# Patient Record
Sex: FEMALE | Race: WHITE
[De-identification: names, ages, dates, MRNs, and addresses within clinical notes are randomized per-mention and may not be internally consistent; named-entity substitution may affect disease eponyms.]

---

## 2017-06-05 ENCOUNTER — HOSPITAL ENCOUNTER (OUTPATIENT)
Dept: HOSPITAL 8 - CFH | Age: 57
Discharge: HOME | End: 2017-06-05
Attending: OBSTETRICS & GYNECOLOGY
Payer: COMMERCIAL

## 2017-06-05 DIAGNOSIS — Z80.3: ICD-10-CM

## 2017-06-05 DIAGNOSIS — Z12.31: Primary | ICD-10-CM

## 2017-06-05 PROCEDURE — 77063 BREAST TOMOSYNTHESIS BI: CPT

## 2017-06-05 PROCEDURE — G0202 SCR MAMMO BI INCL CAD: HCPCS

## 2017-10-12 ENCOUNTER — OFFICE VISIT (OUTPATIENT)
Dept: MEDICAL GROUP | Facility: MEDICAL CENTER | Age: 57
End: 2017-10-12
Payer: COMMERCIAL

## 2017-10-12 VITALS
HEIGHT: 66 IN | OXYGEN SATURATION: 96 % | DIASTOLIC BLOOD PRESSURE: 70 MMHG | TEMPERATURE: 98.4 F | HEART RATE: 67 BPM | BODY MASS INDEX: 23.01 KG/M2 | WEIGHT: 143.2 LBS | RESPIRATION RATE: 16 BRPM | SYSTOLIC BLOOD PRESSURE: 122 MMHG

## 2017-10-12 DIAGNOSIS — G44.81 HYPNIC HEADACHE: ICD-10-CM

## 2017-10-12 DIAGNOSIS — Z76.89 ENCOUNTER TO ESTABLISH CARE: ICD-10-CM

## 2017-10-12 DIAGNOSIS — G43.001 MIGRAINE WITHOUT AURA AND WITH STATUS MIGRAINOSUS, NOT INTRACTABLE: ICD-10-CM

## 2017-10-12 DIAGNOSIS — I10 ESSENTIAL HYPERTENSION: ICD-10-CM

## 2017-10-12 DIAGNOSIS — Z12.11 SCREENING FOR MALIGNANT NEOPLASM OF COLON: ICD-10-CM

## 2017-10-12 DIAGNOSIS — E04.1 THYROID NODULE: ICD-10-CM

## 2017-10-12 DIAGNOSIS — Z23 NEED FOR INFLUENZA VACCINATION: ICD-10-CM

## 2017-10-12 DIAGNOSIS — E78.5 DYSLIPIDEMIA: ICD-10-CM

## 2017-10-12 DIAGNOSIS — Z00.00 HEALTH CARE MAINTENANCE: ICD-10-CM

## 2017-10-12 PROCEDURE — 90471 IMMUNIZATION ADMIN: CPT | Performed by: INTERNAL MEDICINE

## 2017-10-12 PROCEDURE — 90686 IIV4 VACC NO PRSV 0.5 ML IM: CPT | Performed by: INTERNAL MEDICINE

## 2017-10-12 PROCEDURE — 99204 OFFICE O/P NEW MOD 45 MIN: CPT | Mod: 25 | Performed by: INTERNAL MEDICINE

## 2017-10-12 RX ORDER — INDOMETHACIN 75 MG/1
75 CAPSULE, EXTENDED RELEASE ORAL DAILY
COMMUNITY
End: 2017-12-06

## 2017-10-12 RX ORDER — SUMATRIPTAN 100 MG/1
100 TABLET, FILM COATED ORAL
COMMUNITY
End: 2018-07-23 | Stop reason: SDUPTHER

## 2017-10-12 RX ORDER — ATORVASTATIN CALCIUM 10 MG/1
10 TABLET, FILM COATED ORAL NIGHTLY
COMMUNITY
End: 2018-02-13 | Stop reason: SDUPTHER

## 2017-10-12 RX ORDER — PROPRANOLOL HYDROCHLORIDE 20 MG/1
20 TABLET ORAL 2 TIMES DAILY
COMMUNITY
End: 2017-12-06 | Stop reason: SDUPTHER

## 2017-10-12 ASSESSMENT — PATIENT HEALTH QUESTIONNAIRE - PHQ9: CLINICAL INTERPRETATION OF PHQ2 SCORE: 0

## 2017-10-12 NOTE — LETTER
[facility]  Sera Grey M.D.  [address] [place] [address]  Fax: [phone]   Authorization for Release/Disclosure of   Protected Health Information   Name: MARYANN SOUTH : 1960 SSN: [id number]   Address: [address] [place] [address] [place] [address] Phone:    [phone] (home)    I authorize the entity listed below to release/disclose the PHI below to:   [facility]/Sera Grey M.D. and Alissa Garcia M.D.   Provider or Entity Name:     Address   City, State, Zip   Phone:      Fax:     Reason for request: continuity of care   Information to be released:    [  ] LAST COLONOSCOPY,  including any PATH REPORT and follow-up  [  ] LAST FIT/COLOGUARD RESULT [  ] LAST DEXA  [  ] LAST MAMMOGRAM  [  ] LAST PAP  [  ] LAST LABS [  ] RETINA EXAM REPORT  [  ] IMMUNIZATION RECORDS  [  ] Release all info      [  ] Check here and initial the line next to each item to release ALL health information INCLUDING  _____ Care and treatment for drug and / or alcohol abuse  _____ HIV testing, infection status, or AIDS  _____ Genetic Testing    DATES OF SERVICE OR TIME PERIOD TO BE DISCLOSED: _____________  I understand and acknowledge that:  * This Authorization may be revoked at any time by you in writing, except if your health information has already been used or disclosed.  * Your health information that will be used or disclosed as a result of you signing this authorization could be re-disclosed by the recipient. If this occurs, your re-disclosed health information may no longer be protected by State or Federal laws.  * You may refuse to sign this Authorization. Your refusal will not affect your ability to obtain treatment.  * This Authorization becomes effective upon signing and will  on (date) __________.      If no date is indicated, this Authorization will  one (1) year from the signature date.    Name: Maryann South    Signature:   Date:     10/12/2017       PLEASE FAX REQUESTED  RECORDS BACK TO: [phone]

## 2017-10-12 NOTE — PROGRESS NOTES
CHIEF COMPLAINT  Chief Complaint   Patient presents with   • Establish Care   Thyroid nodule    HPI  Patient is a 56 y.o. female patient who presents today for the following     Thyroid nodules, multiple  New Dg: US on 8/25/17:  - posterior mid gland; size, 0.9-1cm, with unclear margins, suspicious.   Per patient, TSH was normal.   ENT OV: 10/16/17, in 4 days.      HYPERTENSION  Meds: propranolol, 20 mg TID, taking as prescribed.   Measuring BP at home: yes,, has jacques < 140/90.  Denies:  -  headaches, vision problems, tinnitus.                 -  chest pain/pressure, palpitations, irregular heart beats, exertional, dyspnea, peripheral edema.      - medication side effect: unusual fatigue, slow heartbeat, foot/leg swelling, cough.  Low salt diet: no  Diet: regular  Exercise: > 4 days a week  BMI: Body mass index is 23.11 kg/m².  FH of HTN: father    DYSLIPIDEMIA  The patient is on lipitor 10 mh, taking daily, as prescribed. No myalgias, muscle cramps or pain.   Diet /Exercise:  As above  BMI: There is no weight on file to calculate BMI.  FH: father    Hypnic HA  Sx: at the age of 48  Dg: ~ 5 yrs  Meds: on indomethacin 75 mg QD.    MIGRAINE  Onset: after he age of 49 y/o  The patient reports  left temporal headaches, described as: moderate in severity , dull with no nausea, emesis, photophobia or aura, without radiation.  Usual frequency is once a month.  Aura is not present.  Headaches are relieved by sumatriptan, propranolol as prophylaxis  Known triggers include: none.   Current stressors include: none  Previous treatment and prevention include: none    Pertinent negatives:  Denies difficulty with speech or swallowing, facial numbness or tingling, focal weakness. Denies sore throat or cough, fever, sinus congestion, ear pain, tooth clenching or grinding.    Family Hx: no known family members with significant headaches  Prior imaging: yes    Reviewed PMH, PSH, FH, SH, ALL, HCM/IMM  Reviewed MEDS    Patient Active  Problem List    Diagnosis Date Noted   • Health care maintenance 10/12/2017   • Essential hypertension 10/12/2017   • Dyslipidemia 10/12/2017   • Hypnic headache 10/12/2017   • Thyroid nodule 10/12/2017   • Giant cell tumor of bone      CURRENT MEDICATIONS  Current Outpatient Prescriptions   Medication Sig Dispense Refill   • propranolol (INDERAL) 20 MG Tab Take 20 mg by mouth 3 times a day.     • atorvastatin (LIPITOR) 10 MG Tab Take 10 mg by mouth every evening.     • indomethacin SR (INDOCIN SR) 75 MG Cap CR Take 75 mg by mouth every day.     • sumatriptan (IMITREX) 100 MG tablet Take 100 mg by mouth Once PRN for Migraine.       No current facility-administered medications for this visit.      ALLERGIES  Allergies: Review of patient's allergies indicates no known allergies.    PAST MEDICAL HISTORY  Past Medical History:   Diagnosis Date   • Giant cell tumor of bone 2006    removal from L distal femur   • Hypertension    • Hypnic headache    • Migraine    • Thyroid nodule      SURGICAL HISTORY  She  has a past surgical history that includes fusion (2004) and abdominal hysterectomy total (1985).    SOCIAL HISTORY  Social History   Substance Use Topics   • Smoking status: Never Smoker   • Smokeless tobacco: Never Used   • Alcohol use Yes      Comment: occ     Social History     Social History Narrative   • No narrative on file     FAMILY HISTORY  Family History   Problem Relation Age of Onset   • Cancer Mother      breast   • Diabetes Mother    • Cancer Father      metastatic   • Heart Disease Father    • Hypertension Father    • Cancer Sister      breast   • Diabetes Sister    • Diabetes Maternal Grandmother      Family Status   Relation Status   • Mother    • Father    • Sister    • Maternal Grandmother        ROS   Constitutional: Negative for fever, chills.  HENT: Negative for congestion, sore throat.  Eyes: Negative for blurred vision.   Respiratory: Negative for cough, shortness of breath.  Cardiovascular:  "Negative for chest pain, palpitations. And per HPI.  Gastrointestinal: Negative for heartburn, nausea, abdominal pain.   Genitourinary: Negative for dysuria.  Musculoskeletal: Negative for significant myalgias, back pain and joint pain.   Skin: Negative for rash and itching.   Neuro: Negative for dizziness, weakness and as above.  Endo/Heme/Allergies: Does not bruise/bleed easily. And per HPI.  Psychiatric/Behavioral: Negative for depression, anxiety    PHYSICAL EXAM   Blood pressure 122/70, pulse 67, temperature 36.9 °C (98.4 °F), resp. rate 16, height 1.676 m (5' 6\"), weight 65 kg (143 lb 3.2 oz), SpO2 96 %. Body mass index is 23.11 kg/m².  General:  NAD, well appearing  HEENT:   NC/AT, PERRLA, EOMI, TMs are clear. Oropharyngeal mucosa is pink,  without lesions;  no cervical / supraclavicular  lymphadenopathy, no thyromegaly.    Cardiovascular: RRR.   No m/r/g. No carotid bruits .      Lungs:   CTAB, no w/r/r, no respiratory distress.  Abdomen: Soft, NT/ND + BS; no suprapubic tenderness; no masses or hepatosplenomegaly.  Extremities:  2+ DP and radial pulses bilaterally.  No c/c/e.   Skin:  Warm, dry.  No erythema. No rash.   Neurologic: Alert & oriented x 3. CN II-XII grossly intact. Brachioradialis / knee DTR are 2/4, symmetric. Strength and sensation grossly intact.  No focal deficits.  Psychiatric:  Affect normal, mood normal, judgment normal.    LABS     Pending old records    IMAGING     Reviewed thyroid ultrasound from 8/29/17 (scaned in media):  Bilateral thyroid nodules, suspicious thyroid nodule in posterior mid gland.    ASSESMENT AND PLAN        1. Thyroid nodule  The patient will be seen by ENT in 4 days    2. Essential hypertension  Controlled, continue current treatment    3. Dyslipidemia  Continue current treatment, pending old records    4. Hypnic headache  Controlled, continue current treatment    5. Migraine without aura and with status migrainosus, not intractable  Controlled, continue current " treatment    6. Encounter to establish care  Reviewed PMH, PSH, FH, SH, ALL, MEDS, HCM/IMM.   Advised healthy habits, diet, exercise.  Release form for old records: signed    7. Need for influenza vaccination  - Flu Quad Inj >3 Year Pre-Filled (Preservative Free)  Information was provided to the patient regarding the vaccine, including side effects. Vaccine was given by my medical assistant under my supervision.    8. Health care maintenance  Pending old records    9. Screening for malignant neoplasm of colon  - OCCULT BLOOD FECES IMMUNOASSAY; Future    Counseling:   - Smoking:  Nonsmoker    Followup: Return in about 3 months (around 1/12/2018) for Short.    All questions are answered.    Please note that this dictation was created using voice recognition software, and that there might be errors of jenny and possibly content.

## 2017-10-12 NOTE — LETTER
[facility]  Sera Grey M.D.  [address] [place] [address]  Fax: [phone]   Authorization for Release/Disclosure of   Protected Health Information   Name: MARYANN SOUTH : 1960 SSN: [id number]   Address: [address] [place] [address] [place] [address] Phone:    [phone] (home)    I authorize the entity listed below to release/disclose the PHI below to:   [facility]/Sera Grey M.D. and Alissa Garcia M.D.   Provider or Entity Name:     Address   City, State, Zip   Phone:      Fax:     Reason for request: continuity of care   Information to be released:    [  ] LAST COLONOSCOPY,  including any PATH REPORT and follow-up  [  ] LAST FIT/COLOGUARD RESULT [  ] LAST DEXA  [  ] LAST MAMMOGRAM  [  ] LAST PAP  [  ] LAST LABS [  ] RETINA EXAM REPORT  [  ] IMMUNIZATION RECORDS  [  ] Release all info      [  ] Check here and initial the line next to each item to release ALL health information INCLUDING  _____ Care and treatment for drug and / or alcohol abuse  _____ HIV testing, infection status, or AIDS  _____ Genetic Testing    DATES OF SERVICE OR TIME PERIOD TO BE DISCLOSED: _____________  I understand and acknowledge that:  * This Authorization may be revoked at any time by you in writing, except if your health information has already been used or disclosed.  * Your health information that will be used or disclosed as a result of you signing this authorization could be re-disclosed by the recipient. If this occurs, your re-disclosed health information may no longer be protected by State or Federal laws.  * You may refuse to sign this Authorization. Your refusal will not affect your ability to obtain treatment.  * This Authorization becomes effective upon signing and will  on (date) __________.      If no date is indicated, this Authorization will  one (1) year from the signature date.    Name: Maryann South    Signature:   Date:     10/12/2017       PLEASE FAX REQUESTED  RECORDS BACK TO: [phone]

## 2017-11-30 ENCOUNTER — OFFICE VISIT (OUTPATIENT)
Dept: MEDICAL GROUP | Facility: PHYSICIAN GROUP | Age: 57
End: 2017-11-30
Payer: COMMERCIAL

## 2017-11-30 VITALS
BODY MASS INDEX: 24.23 KG/M2 | TEMPERATURE: 98.9 F | OXYGEN SATURATION: 94 % | SYSTOLIC BLOOD PRESSURE: 144 MMHG | HEART RATE: 92 BPM | DIASTOLIC BLOOD PRESSURE: 80 MMHG | WEIGHT: 150.79 LBS | HEIGHT: 66 IN

## 2017-11-30 DIAGNOSIS — N30.01 ACUTE CYSTITIS WITH HEMATURIA: ICD-10-CM

## 2017-11-30 PROCEDURE — 99000 SPECIMEN HANDLING OFFICE-LAB: CPT | Performed by: FAMILY MEDICINE

## 2017-11-30 PROCEDURE — 99213 OFFICE O/P EST LOW 20 MIN: CPT | Mod: 25 | Performed by: FAMILY MEDICINE

## 2017-11-30 RX ORDER — NITROFURANTOIN 25; 75 MG/1; MG/1
100 CAPSULE ORAL 2 TIMES DAILY
Qty: 10 CAP | Refills: 0 | Status: SHIPPED | OUTPATIENT
Start: 2017-11-30 | End: 2018-03-26

## 2017-12-01 NOTE — PROGRESS NOTES
"Subjective:      Maryann Ashley is a 56 y.o. female who presents with UTI            HPI     This is a 56-year-old white female patient of Dr. Bryant is here because of 2 day duration of burning on urination. She said today she started having flank pain. She denies any abdominal pain, frequency, urgency, fever or chills. She said she had UTI in the past and the symptoms felt like these. She said she also has history of microscopic hematuria and she always has trace to small amount of blood in the urine. She said she has been worked up for the hematuria without any cause found. She has not seen any gross blood recently.    She said she just recently got back from a vacation and she was not able to go to the bathroom right away when her bladder got while she was on the plane or in the airport.    She also has left arm pain that started when she was on vacation. This is however getting better. She already scheduled an appointment to see Dr. Bryant for this problem and her appointment is on 12/6/17.    Past medical history, past surgical history, family history reviewed-no changes    Social history reviewed-no changes    Allergies reviewed-no changes    Medications reviewed-no changes    ROS     Review of systems as per history of present illness, the rest are negative.       Objective:     /80   Pulse 92   Temp 37.2 °C (98.9 °F)   Ht 1.676 m (5' 6\")   Wt 68.4 kg (150 lb 12.7 oz)   SpO2 94%   BMI 24.34 kg/m²      Physical Exam     Examined alert, awake, oriented, not in distress    Neck-supple, no lymphadenopathy, no thyromegaly  Lungs-clear to auscultation, no rales, no wheezes  Heart-regular rate and rhythm, no murmur  Abdomen-good bowel sounds, soft, nontender, no hepatosplenomegaly, no masses, no CVA tenderness.  Extremities-no edema, clubbing, cyanosis    Urine dipstick    Blood large, leukocytes moderate  , the rest are negative          Assessment/Plan:     1. Acute cystitis with hematuria   Urine dipstick " consistent with UTI. I will treat with Macrodantin pending culture results. Advised increase by mouth fluids. I will communicate with patient results once available. Keep appointment with Dr. Bryant next week.  - POCT Urinalysis  - URINE CULTURE(NEW); Future  - nitrofurantoin monohydr macro (MACROBID) 100 MG Cap; Take 1 Cap by mouth 2 times a day.  Dispense: 10 Cap; Refill: 0      Please note that this dictation was created using voice recognition software. I have worked with consultants from the vendor as well as technical experts from [facility] to optimize the interface. I have made every reasonable attempt to correct obvious errors, but I expect that there are errors of grammar and possibly content I did not discover before finalizing the note.

## 2017-12-04 ENCOUNTER — APPOINTMENT (OUTPATIENT)
Dept: OTHER | Facility: IMAGING CENTER | Age: 57
End: 2017-12-04

## 2017-12-04 PROCEDURE — 97530 THERAPEUTIC ACTIVITIES: CPT | Performed by: FAMILY MEDICINE

## 2017-12-05 ENCOUNTER — TELEPHONE (OUTPATIENT)
Dept: MEDICAL GROUP | Facility: PHYSICIAN GROUP | Age: 57
End: 2017-12-05

## 2017-12-05 NOTE — TELEPHONE ENCOUNTER
----- Message from Meagan Rodriguez M.D. sent at 12/5/2017 10:41 AM PST -----  Her urine culture came back she definitely has urinary tract infection and antibiotics I gave her will work for the infection. I hope she is already feeling better.

## 2017-12-06 ENCOUNTER — OFFICE VISIT (OUTPATIENT)
Dept: MEDICAL GROUP | Facility: MEDICAL CENTER | Age: 57
End: 2017-12-06
Payer: COMMERCIAL

## 2017-12-06 VITALS
HEART RATE: 74 BPM | SYSTOLIC BLOOD PRESSURE: 112 MMHG | HEIGHT: 66 IN | OXYGEN SATURATION: 96 % | RESPIRATION RATE: 16 BRPM | TEMPERATURE: 97.7 F | WEIGHT: 146.83 LBS | BODY MASS INDEX: 23.6 KG/M2 | DIASTOLIC BLOOD PRESSURE: 70 MMHG

## 2017-12-06 DIAGNOSIS — Z12.39 SCREENING FOR MALIGNANT NEOPLASM OF BREAST: ICD-10-CM

## 2017-12-06 DIAGNOSIS — E04.1 THYROID NODULE: ICD-10-CM

## 2017-12-06 DIAGNOSIS — G44.81 HYPNIC HEADACHE: ICD-10-CM

## 2017-12-06 DIAGNOSIS — I10 ESSENTIAL HYPERTENSION: ICD-10-CM

## 2017-12-06 PROCEDURE — 99214 OFFICE O/P EST MOD 30 MIN: CPT | Performed by: INTERNAL MEDICINE

## 2017-12-06 RX ORDER — PROPRANOLOL HYDROCHLORIDE 20 MG/1
20 TABLET ORAL 2 TIMES DAILY
Qty: 60 TAB | Refills: 5 | Status: SHIPPED | OUTPATIENT
Start: 2017-12-06 | End: 2018-06-15 | Stop reason: SDUPTHER

## 2017-12-06 NOTE — PROGRESS NOTES
CHIEF COMPLAINT  Thyroid nodule    HPI  Patient is a 56 y.o. female patient who presents today for the following     Thyroid nodules, multiple  New Dg: US on 8/25/17:  - posterior mid gland; size, 0.9-1cm, with unclear margins, suspicious.   Per patient, TSH was normal.   ENT OV: 10/16/17,   - requested bx, that was unsuccessful due to the size.         HYPERTENSION  Meds: propranolol, 20 mg TID, taking as prescribed; NEEDS REFILL.   Measuring BP at home: yes,, has jacques < 140/90.  Denies:  -  headaches, vision problems, tinnitus.                 -  chest pain/pressure, palpitations, irregular heart beats, exertional, dyspnea, peripheral edema.                 - medication side effect: unusual fatigue, slow heartbeat, foot/leg swelling, cough.  Low salt diet: no  Diet: regular  Exercise: > 4 days a week  BMI: Body mass index is 23 kg/m².  FH of HTN: father     Hypnic HA  Sx: at the age of 48  Dg: ~ 5 yrs  Meds: stopped indomethacin.   - started melatonin 3 mg HS, that improved symptoms     Reviewed PMH, PSH, FH, SH, ALL, HCM/IMM, no changes  Reviewed MEDS, no changes    Patient Active Problem List    Diagnosis Date Noted   • Health care maintenance 10/12/2017   • Essential hypertension 10/12/2017   • Dyslipidemia 10/12/2017   • Hypnic headache 10/12/2017   • Thyroid nodule 10/12/2017   • Giant cell tumor of bone      CURRENT MEDICATIONS  Current Outpatient Prescriptions   Medication Sig Dispense Refill   • propranolol (INDERAL) 20 MG Tab Take 1 Tab by mouth 2 times a day. 60 Tab 5   • nitrofurantoin monohydr macro (MACROBID) 100 MG Cap Take 1 Cap by mouth 2 times a day. 10 Cap 0   • atorvastatin (LIPITOR) 10 MG Tab Take 10 mg by mouth every evening.     • sumatriptan (IMITREX) 100 MG tablet Take 100 mg by mouth Once PRN for Migraine.       No current facility-administered medications for this visit.      ALLERGIES  Allergies: Patient has no known allergies.  PAST MEDICAL HISTORY  Past Medical History:   Diagnosis Date  "  • Giant cell tumor of bone 2006    removal from L distal femur   • Hypertension    • Hypnic headache    • Migraine    • Thyroid nodule      SURGICAL HISTORY  She  has a past surgical history that includes fusion (2004) and abdominal hysterectomy total (1985).  SOCIAL HISTORY  Social History   Substance Use Topics   • Smoking status: Never Smoker   • Smokeless tobacco: Never Used   • Alcohol use Yes      Comment: occ     Social History     Social History Narrative   • No narrative on file     FAMILY HISTORY  Family History   Problem Relation Age of Onset   • Cancer Mother      breast   • Diabetes Mother    • Cancer Father      metastatic   • Heart Disease Father    • Hypertension Father    • Cancer Sister      breast   • Diabetes Sister    • Diabetes Maternal Grandmother      Family Status   Relation Status   • Mother    • Father    • Sister    • Maternal Grandmother      ROS   Constitutional: Negative for fever, chills.  HENT: Negative for congestion, sore throat.  Eyes: Negative for blurred vision.   Respiratory: Negative for cough, shortness of breath.  Cardiovascular: Negative for chest pain, palpitations. And per HPI.  Gastrointestinal: Negative for heartburn, nausea, abdominal pain.   Genitourinary: Negative for dysuria.  Musculoskeletal: Negative for significant myalgias, back pain and joint pain.   Skin: Negative for rash and itching.   Neuro: Negative for dizziness, weakness and as above.   Endo/Heme/Allergies: Does not bruise/bleed easily. And per HPI.  Psychiatric/Behavioral: Negative for depression, anxiety    PHYSICAL EXAM   Blood pressure 112/70, pulse 74, temperature 36.5 °C (97.7 °F), resp. rate 16, height 1.676 m (5' 6\"), weight 66.6 kg (146 lb 13.2 oz), SpO2 96 %. Body mass index is 23.7 kg/m².  General:  NAD, well appearing  HEENT:   NC/AT, PERRLA, EOMI, TMs are clear. Oropharyngeal mucosa is pink,  without lesions;  no cervical / supraclavicular  lymphadenopathy, no thyromegaly.  "   Cardiovascular: RRR.   No m/r/g. No carotid bruits .      Lungs:   CTAB, no w/r/r, no respiratory distress.  Abdomen: Soft, NT/ND + BS; no suprapubic tenderness; no masses or hepatosplenomegaly.  Extremities:  2+ DP and radial pulses bilaterally.  No c/c/e.   Skin:  Warm, dry.  No erythema. No rash.   Neurologic: Alert & oriented x 3.  No focal deficits.  Psychiatric:  Affect normal, mood normal, judgment normal.    LABS     Pending    IMAGING     None    ASSESMENT AND PLAN        1. Thyroid nodule  Follow-up thyroid ultrasound after 6 months  - US-SOFT TISSUES OF HEAD - NECK; Future  - TSH; Future  - FREE THYROXINE; Future    2. Essential hypertension  Controlled, continue current treatment, refill:  - propranolol (INDERAL) 20 MG Tab; Take 1 Tab by mouth 2 times a day.  Dispense: 60 Tab; Refill: 5    3. Hypnic headache  May increase melatonin to 6 and then maximum 9 mg per day    4. Screening for malignant neoplasm of breast  - MA-MAMMO SCREENING BILAT W/RIVERA W/CAD; Future    Counseling:   - Smoking:  Nonsmoker    Followup: Return in about 3 months (around 3/6/2018) for Short.    All questions are answered.    Please note that this dictation was created using voice recognition software, and that there might be errors of jenny and possibly content.

## 2018-01-23 ENCOUNTER — PATIENT MESSAGE (OUTPATIENT)
Dept: MEDICAL GROUP | Facility: MEDICAL CENTER | Age: 58
End: 2018-01-23

## 2018-01-23 DIAGNOSIS — H18.519 FUCHS' CORNEAL DYSTROPHY: ICD-10-CM

## 2018-01-25 ENCOUNTER — PATIENT MESSAGE (OUTPATIENT)
Dept: MEDICAL GROUP | Facility: MEDICAL CENTER | Age: 58
End: 2018-01-25

## 2018-01-25 DIAGNOSIS — I10 ESSENTIAL HYPERTENSION: ICD-10-CM

## 2018-01-25 RX ORDER — INDOMETHACIN 75 MG/1
CAPSULE, EXTENDED RELEASE ORAL
COMMUNITY
Start: 2017-11-28 | End: 2018-01-26 | Stop reason: SDUPTHER

## 2018-01-25 RX ORDER — INDOMETHACIN 75 MG/1
75 CAPSULE, EXTENDED RELEASE ORAL
Qty: 30 CAP | Refills: 0 | OUTPATIENT
Start: 2018-01-25

## 2018-01-25 NOTE — TELEPHONE ENCOUNTER
Was the patient seen in the last year in this department? Yes     Does patient have an active prescription for medications requested? No     Received Request Via: Patient     MA NOTE: Patient would like call back of status of rx

## 2018-01-25 NOTE — TELEPHONE ENCOUNTER
Please contact this patient and let her know that she has not had that prescription for a number of years from this clinic. That is the medication that can adversely affect her kidneys. I need for her to do a comprehensive metabolic panel before I can fill that prescription. I have ordered the lab tests and she needs to do it before I can provide a refill. Thank you    Also, could you please ask her why she takes this medication. It typically was used for gout and I don't show that she has that problem. I need to document the reason she takes this medication.

## 2018-01-26 ENCOUNTER — TELEPHONE (OUTPATIENT)
Dept: MEDICAL GROUP | Facility: MEDICAL CENTER | Age: 58
End: 2018-01-26

## 2018-01-26 DIAGNOSIS — G44.81 HYPNIC HEADACHE: ICD-10-CM

## 2018-01-26 DIAGNOSIS — M72.0 DUPUYTREN'S CONTRACTURE: ICD-10-CM

## 2018-01-26 RX ORDER — INDOMETHACIN 75 MG/1
75 CAPSULE, EXTENDED RELEASE ORAL DAILY
Qty: 30 CAP | Refills: 2 | Status: SHIPPED | OUTPATIENT
Start: 2018-01-26 | End: 2018-09-18 | Stop reason: SDUPTHER

## 2018-01-26 RX ORDER — INDOMETHACIN 75 MG/1
75 CAPSULE, EXTENDED RELEASE ORAL 2 TIMES DAILY PRN
Qty: 30 CAP | Refills: 2 | Status: SHIPPED | OUTPATIENT
Start: 2018-01-26 | End: 2018-01-29

## 2018-01-26 NOTE — TELEPHONE ENCOUNTER
----- Message from Mariah Jay sent at 1/26/2018 10:23 AM PST -----  Patient came in to request refill for indomethacin SR (INDOCIN SR) 75 MG Cap CR [[id number]. Patient also states was a confusion with who PCP was so she wanted to clarify that her primary is . I already changed it in to PCP and let patient know that Dylon Ortiz Ass't routed this conversation to Alissa Garcia M.D. But patient insisted I resend request because she only has two pills left. Thank you

## 2018-01-29 ENCOUNTER — OFFICE VISIT (OUTPATIENT)
Dept: MEDICAL GROUP | Facility: MEDICAL CENTER | Age: 58
End: 2018-01-29
Payer: COMMERCIAL

## 2018-01-29 VITALS
TEMPERATURE: 96 F | HEIGHT: 66 IN | SYSTOLIC BLOOD PRESSURE: 124 MMHG | BODY MASS INDEX: 23.78 KG/M2 | OXYGEN SATURATION: 96 % | HEART RATE: 72 BPM | WEIGHT: 148 LBS | DIASTOLIC BLOOD PRESSURE: 72 MMHG

## 2018-01-29 DIAGNOSIS — Z12.83 SKIN CANCER SCREENING: ICD-10-CM

## 2018-01-29 DIAGNOSIS — Z00.00 HEALTH CARE MAINTENANCE: ICD-10-CM

## 2018-01-29 DIAGNOSIS — L98.9 SKIN LESION: ICD-10-CM

## 2018-01-29 DIAGNOSIS — G47.9 SLEEP DISORDER: ICD-10-CM

## 2018-01-29 PROCEDURE — 99214 OFFICE O/P EST MOD 30 MIN: CPT | Performed by: INTERNAL MEDICINE

## 2018-01-29 RX ORDER — TRAZODONE HYDROCHLORIDE 50 MG/1
50 TABLET ORAL NIGHTLY PRN
Qty: 30 TAB | Refills: 1 | Status: SHIPPED | OUTPATIENT
Start: 2018-01-29 | End: 2019-09-13 | Stop reason: SDUPTHER

## 2018-01-29 NOTE — LETTER
[facility]  Alissa Garcia M.D.  [address]  [place] [address]  Fax: [phone]   Authorization for Release/Disclosure of   Protected Health Information   Name: MARYANN ASHLEY : 1960 SSN: [id number]   Address: [address] [place] [address]  [place] [address] Phone:    [phone] (home)    I authorize the entity listed below to release/disclose the PHI below to:   [facility]/Alissa Garcia M.D. and Alissa Garcia M.D.   Provider or Entity Name:  [facility]   City, State, [facility]   Phone:      Fax:     Reason for request: continuity of care   Information to be released:    [  ] LAST COLONOSCOPY,  including any PATH REPORT and follow-up  [  ] LAST FIT/COLOGUARD RESULT [  ] LAST DEXA  [xxxxxxxxx] LAST MAMMOGRAM  [  ] LAST PAP  [  ] LAST LABS [  ] RETINA EXAM REPORT  [  ] IMMUNIZATION RECORDS  [  ] Release all info      [  ] Check here and initial the line next to each item to release ALL health information INCLUDING  _____ Care and treatment for drug and / or alcohol abuse  _____ HIV testing, infection status, or AIDS  _____ Genetic Testing    DATES OF SERVICE OR TIME PERIOD TO BE DISCLOSED: _____________  I understand and acknowledge that:  * This Authorization may be revoked at any time by you in writing, except if your health information has already been used or disclosed.  * Your health information that will be used or disclosed as a result of you signing this authorization could be re-disclosed by the recipient. If this occurs, your re-disclosed health information may no longer be protected by State or Federal laws.  * You may refuse to sign this Authorization. Your refusal will not affect your ability to obtain treatment.  * This Authorization becomes effective upon signing and will  on (date) __________.      If no date is indicated, this Authorization will  one (1) year from the signature date.    Name: Maryann Ashley    Signature:   Date:          1/29/2018       PLEASE FAX REQUESTED RECORDS BACK TO: [phone]

## 2018-01-30 NOTE — PROGRESS NOTES
CHIEF COMPLAINT  Chief Complaint   Patient presents with   • Nose skin  Lesion, sleep problem     HPI  Patient is a 57 y.o. female patient who presents today for the following     Skin lesion  Onset: 2-3 months ago  C/o small, brownish lesion on the R side of the nose, gradually increasing in size.  Denies insect bites, fever, chills, redness or swelling.   No significant sunlight exposure.   No past medical history of family history of skin cancer.   She has been followed up by dermatology for skin cancer screen.    Insomnia  She has a trouble to stay asleep. No meds.   Discussed sleep hygiene:   - Go to bed and wake up at consistent times whether work/school day or not.   - Keep room dark, quiet, and comfortable.   - Don't have naps.  - Avoid stimulant or caffeine use more than 4 hours after wake time.   - Avoid meal or exercise 2-3 hours prior to going to bed.    Reviewed PMH, PSH, FH, SH, ALL, HCM/IMM, no changes  Reviewed MEDS, no changes    Patient Active Problem List    Diagnosis Date Noted   • Sleep disorder 01/29/2018   • Fuchs' corneal dystrophy 01/23/2018   • Health care maintenance 10/12/2017   • Essential hypertension 10/12/2017   • Dyslipidemia 10/12/2017   • Hypnic headache 10/12/2017   • Thyroid nodule 10/12/2017   • Giant cell tumor of bone      CURRENT MEDICATIONS  Current Outpatient Prescriptions   Medication Sig Dispense Refill   • indomethacin SR (INDOCIN SR) 75 MG Cap CR Take 1 Cap by mouth every day. 30 Cap 2   • propranolol (INDERAL) 20 MG Tab Take 1 Tab by mouth 2 times a day. 60 Tab 5   • nitrofurantoin monohydr macro (MACROBID) 100 MG Cap Take 1 Cap by mouth 2 times a day. 10 Cap 0   • atorvastatin (LIPITOR) 10 MG Tab Take 10 mg by mouth every evening.     • sumatriptan (IMITREX) 100 MG tablet Take 100 mg by mouth Once PRN for Migraine.       No current facility-administered medications for this visit.      ALLERGIES  Allergies: Patient has no known allergies.  PAST MEDICAL HISTORY  Past  "Medical History:   Diagnosis Date   • Fuchs' corneal dystrophy 1/23/2018   • Giant cell tumor of bone 2006    removal from L distal femur   • Hypertension    • Hypnic headache    • Migraine    • Thyroid nodule      SURGICAL HISTORY  She  has a past surgical history that includes fusion (2004) and abdominal hysterectomy total (1985).  SOCIAL HISTORY  Social History   Substance Use Topics   • Smoking status: Never Smoker   • Smokeless tobacco: Never Used   • Alcohol use Yes      Comment: occ     Social History     Social History Narrative   • No narrative on file     FAMILY HISTORY  Family History   Problem Relation Age of Onset   • Cancer Mother      breast   • Diabetes Mother    • Cancer Father      metastatic   • Heart Disease Father    • Hypertension Father    • Cancer Sister      breast   • Diabetes Sister    • Diabetes Maternal Grandmother      Family Status   Relation Status   • Mother    • Father    • Sister    • Maternal Grandmother      ROS   Constitutional: Negative for fever, chills.  HENT: Negative for congestion, sore throat.  Eyes: Negative for blurred vision.   Respiratory: Negative for cough, shortness of breath.  Cardiovascular: Negative for chest pain, palpitations.   Gastrointestinal: Negative for heartburn, nausea, abdominal pain.   Genitourinary: Negative for dysuria.  Musculoskeletal: Negative for significant myalgias, back pain and joint pain.   Skin: as above.  Neuro: Negative for dizziness, weakness and headaches.   Endo/Heme/Allergies: Does not bruise/bleed easily.   Psychiatric/Behavioral: Negative for depression, anxiety.and    PHYSICAL EXAM   Blood pressure 124/72, pulse 72, temperature (!) 35.6 °C (96 °F), height 1.676 m (5' 6\"), weight 67.1 kg (148 lb), SpO2 96 %, not currently breastfeeding. Body mass index is 23.89 kg/m².  General:  NAD, well appearing  HEENT:   NC/AT, PERRLA, EOMI, TMs are clear. Oropharyngeal mucosa is pink,  without lesions;  no cervical / supraclavicular  " lymphadenopathy, no thyromegaly.    Cardiovascular: RRR.   No m/r/g. No carotid bruits .      Lungs:   CTAB, no w/r/r, no respiratory distress.  Abdomen: Soft, NT/ND + BS; no suprapubic tenderness; no masses or hepatosplenomegaly.  Extremities:  2+ DP and radial pulses bilaterally.  No c/c/e.   Skin:  Warm, dry.  0.5 cm slightly brown lesion on the R side of the nose.  Neurologic: Alert & oriented x 3. No focal deficits.  Psychiatric:  Affect normal, mood normal, judgment normal.    LABS     None    IMAGING     None    ASSESMENT AND PLAN        1. Skin lesion  - REFERRAL TO DERMATOLOGY  2. Skin cancer screening  - REFERRAL TO DERMATOLOGY  Dr Campbell, her derm    3. Sleep disorder  - trazodone (DESYREL) 50 MG Tab; Take 1 Tab by mouth at bedtime as needed for Sleep.  Dispense: 30 Tab; Refill: 1  - Reviewed effects and side effects of medication, the patient verbalized understanding    4. Health care maintenance  Pending Pap smear records, signed form.    Counseling:   - Smoking:  Nonsmoker    Followup: Return if symptoms worsen or fail to improve, for Short.    All questions are answered.    Please note that this dictation was created using voice recognition software, and that there might be errors of jenny and possibly content.

## 2018-02-13 RX ORDER — ATORVASTATIN CALCIUM 10 MG/1
10 TABLET, FILM COATED ORAL DAILY
Qty: 90 TAB | Refills: 0 | Status: SHIPPED | OUTPATIENT
Start: 2018-02-13 | End: 2018-05-16 | Stop reason: SDUPTHER

## 2018-02-21 DIAGNOSIS — E04.1 THYROID NODULE: ICD-10-CM

## 2018-02-21 DIAGNOSIS — I10 ESSENTIAL HYPERTENSION: ICD-10-CM

## 2018-02-27 ENCOUNTER — TELEPHONE (OUTPATIENT)
Dept: URGENT CARE | Facility: CLINIC | Age: 58
End: 2018-02-27

## 2018-02-27 NOTE — TELEPHONE ENCOUNTER
Dr Bryant,     Pt is concerned about lab results. Can you please review and let me know what to follow up with pt. [facility] results scanned into chart.     Thank you,     Karmen Figueroa  Medical Assistant

## 2018-02-27 NOTE — TELEPHONE ENCOUNTER
Pt notified. Nothing further to do at this time.     Thank you,     Karmen Figueroa  Medical Assistant

## 2018-03-08 ENCOUNTER — HOSPITAL ENCOUNTER (OUTPATIENT)
Dept: RADIOLOGY | Facility: MEDICAL CENTER | Age: 58
End: 2018-03-08
Attending: INTERNAL MEDICINE
Payer: COMMERCIAL

## 2018-03-08 DIAGNOSIS — E04.1 THYROID NODULE: ICD-10-CM

## 2018-03-08 PROCEDURE — 76536 US EXAM OF HEAD AND NECK: CPT

## 2018-03-16 ENCOUNTER — APPOINTMENT (OUTPATIENT)
Dept: MEDICAL GROUP | Facility: MEDICAL CENTER | Age: 58
End: 2018-03-16
Payer: COMMERCIAL

## 2018-03-26 ENCOUNTER — OFFICE VISIT (OUTPATIENT)
Dept: MEDICAL GROUP | Facility: MEDICAL CENTER | Age: 58
End: 2018-03-26
Payer: COMMERCIAL

## 2018-03-26 VITALS
WEIGHT: 147.27 LBS | HEIGHT: 66 IN | HEART RATE: 71 BPM | TEMPERATURE: 98.5 F | SYSTOLIC BLOOD PRESSURE: 100 MMHG | BODY MASS INDEX: 23.67 KG/M2 | DIASTOLIC BLOOD PRESSURE: 64 MMHG | OXYGEN SATURATION: 94 %

## 2018-03-26 DIAGNOSIS — G89.29 CHRONIC BILATERAL LOW BACK PAIN WITH LEFT-SIDED SCIATICA: ICD-10-CM

## 2018-03-26 DIAGNOSIS — G44.81 HEADACHE, HYPNIC: ICD-10-CM

## 2018-03-26 DIAGNOSIS — R73.01 IFG (IMPAIRED FASTING GLUCOSE): ICD-10-CM

## 2018-03-26 DIAGNOSIS — E78.5 DYSLIPIDEMIA: ICD-10-CM

## 2018-03-26 DIAGNOSIS — Z00.00 HEALTH CARE MAINTENANCE: ICD-10-CM

## 2018-03-26 DIAGNOSIS — E04.1 THYROID NODULE: ICD-10-CM

## 2018-03-26 DIAGNOSIS — I10 ESSENTIAL HYPERTENSION: ICD-10-CM

## 2018-03-26 DIAGNOSIS — M54.42 CHRONIC BILATERAL LOW BACK PAIN WITH LEFT-SIDED SCIATICA: ICD-10-CM

## 2018-03-26 PROCEDURE — 99214 OFFICE O/P EST MOD 30 MIN: CPT | Performed by: INTERNAL MEDICINE

## 2018-03-26 NOTE — PROGRESS NOTES
CHIEF COMPLAINT  HTN, labs    HPI  Patient is a 57 y.o. female patient who presents today for the following     HYPERTENSION  Meds: propranolol, 20 mg TID, taking as prescribed.   Measuring BP at home: yes,, has jacques < 140/90.  Denies:  -  headaches, vision problems, tinnitus.                 -  chest pain/pressure, palpitations, irregular heart beats, exertional, dyspnea, peripheral edema.                           - medication side effect: unusual fatigue, slow heartbeat, foot/leg swelling, cough.  Low salt diet: no  Diet: regular  Exercise: > 4 days a week  BMI: Body mass index is 23 kg/m².  FH of HTN: father     DYSLIPIDEMIA  The patient is on lipitor 10 mh, taking daily, as prescribed. No myalgias, muscle cramps or pain.   Diet /Exercise:  As above  BMI: 23  FH: father    Thyroid nodules, multiple  New Dg: US on 8/25/17:  - posterior mid gland; size, 0.9-1cm, with unclear margins, suspicious.   Per patient, TSH was normal.   ENT OV: 10/16/17, in 4 days.      Hypnic HA  Sx: at the age of 48  Dg: ~ 5 yrs  Meds: on indomethacin 75 mg QD.     Hypnic HA  Onset: after he age of 47 y/o  The patient reports  left temporal headaches, described as: moderate in severity , dull with no nausea, emesis, photophobia or aura, without radiation.  Usual frequency is once a month.  Aura is not present.  Headaches are relieved by sumatriptan, propranolol as prophylaxis  Known triggers include: none.   Current stressors include: none  Previous treatment and prevention include: none     Pertinent negatives:  Denies difficulty with speech or swallowing, facial numbness or tingling, focal weakness. Denies sore throat or cough, fever, sinus congestion, ear pain, tooth clenching or grinding.     Family Hx: no known family members with significant headaches  Prior imaging: yes    IFG  The patient had elevated FBG.  No polydipsia, polyphagia, polyuria.  No abdominal pain, weight loss, fatigue.  Diet: regular  Exercise: > 4 days a  week  BMI: Body mass index is 23 kg/m².  FH of DM:     Lower back pain  - Onset: remote  - Triger: positional and activity  - located in: lower back with sciatica  - intensity:  mild to moderate   - quality:  dull and sharp (intermittently and with activity)  - radiation:  no  - alleviating factors are:  rest  -  exacerbating factors are:  activity  - accompanied: no numbness, weakness, tingling, fever, chills  - course: worsening  - therapy: requesting chiropractor    No reported history of:  - chronic immune suppression, alcoholism, IV drug abuse, indwelling catheter, diabetes.    - No history of recent spinal surgery or injection.    Denies:  - numbness/saddle anesthesia.  - bowel/bladder changes, fever.   - trauma  - nausea/vomiting  - chest pain, shortness of breath, abdominal pain.      Reviewed PMH, PSH, FH, SH, ALL, HCM/IMM, no changes  Reviewed MEDS, no changes    Patient Active Problem List    Diagnosis Date Noted   • Sleep disorder 01/29/2018   • Fuchs' corneal dystrophy 01/23/2018   • Health care maintenance 10/12/2017   • Essential hypertension 10/12/2017   • Dyslipidemia 10/12/2017   • Hypnic headache 10/12/2017   • Thyroid nodule 10/12/2017   • Giant cell tumor of bone      CURRENT MEDICATIONS  Current Outpatient Prescriptions   Medication Sig Dispense Refill   • atorvastatin (LIPITOR) 10 MG Tab Take 1 Tab by mouth every day. 90 Tab 0   • trazodone (DESYREL) 50 MG Tab Take 1 Tab by mouth at bedtime as needed for Sleep. 30 Tab 1   • indomethacin SR (INDOCIN SR) 75 MG Cap CR Take 1 Cap by mouth every day. 30 Cap 2   • propranolol (INDERAL) 20 MG Tab Take 1 Tab by mouth 2 times a day. 60 Tab 5   • sumatriptan (IMITREX) 100 MG tablet Take 100 mg by mouth Once PRN for Migraine.       No current facility-administered medications for this visit.      ALLERGIES  Allergies: Patient has no known allergies.  PAST MEDICAL HISTORY  Past Medical History:   Diagnosis Date   • Fuchs' corneal dystrophy 1/23/2018   •  "Giant cell tumor of bone 2006    removal from L distal femur   • Hypertension    • Hypnic headache    • Migraine    • Thyroid nodule      SURGICAL HISTORY  She  has a past surgical history that includes fusion () and abdominal hysterectomy total ().  SOCIAL HISTORY  Social History   Substance Use Topics   • Smoking status: Never Smoker   • Smokeless tobacco: Never Used   • Alcohol use Yes      Comment: 1 per month     Social History     Social History Narrative   • No narrative on file     FAMILY HISTORY  Family History   Problem Relation Age of Onset   • Cancer Mother      breast   • Cancer Father      metastatic; Lung   • Heart Disease Father    • Hypertension Father    • Cancer Sister      breast   • Diabetes Sister    • Diabetes Maternal Grandmother      Family Status   Relation Status   • Mother    • Father    • Sister Alive   • Maternal Grandmother        ROS   Constitutional: Negative for fever, chills.  HENT: Negative for congestion, sore throat.  Eyes: Negative for blurred vision.   Respiratory: Negative for cough, shortness of breath.  Cardiovascular: Negative for chest pain, palpitations. And per HPI.  Gastrointestinal: Negative for heartburn, nausea, abdominal pain.   Genitourinary: Negative for dysuria.  Musculoskeletal: as above.  Skin: Negative for rash and itching.   Neuro: Negative for dizziness, weakness and headaches. And per HPI.  Endo/Heme/Allergies: Does not bruise/bleed easily. And per HPI.  Psychiatric/Behavioral: Negative for depression, anxiety    PHYSICAL EXAM   /64   Pulse 71   Temp 36.9 °C (98.5 °F)   Ht 1.676 m (5' 6\")   Wt 66.8 kg (147 lb 4.3 oz)   SpO2 94%   BMI 23.77 kg/m²   General:  NAD, well appearing  HEENT:   NC/AT, PERRLA, EOMI, TMs are clear. Oropharyngeal mucosa is pink,  without lesions;  no cervical / supraclavicular  lymphadenopathy, no thyromegaly.    Cardiovascular: RRR.   No m/r/g. No carotid bruits .      Lungs:   CTAB, no w/r/r, no " respiratory distress.  Abdomen: Soft, NT/ND + BS; no suprapubic tenderness; no masses or hepatosplenomegaly.  Extremities:  2+ DP and radial pulses bilaterally.  No c/c/e.   Skin:  Warm, dry.  No erythema. No rash.   Neurologic: Alert & oriented x 3. No focal deficits.  Psychiatric:  Affect normal, mood normal, judgment normal.  MS: No TTP over spine or paraspinal muscles.    LABS     Labs are reviewed and discussed with a patient (2/5/18):    - Fasting blood sugar 107  - BMP: GFR 59  - TSH 2.01  - FT4 1.1    IMAGING     Reviewed thyroid ultrasound from 3/8/18:  1.  3 small thyroid nodules are identified all of which measure less than 1 cm in mean diameter which are likely benign.  2.  Echogenicity of the thyroid gland is otherwise homogeneous.    ASSESMENT AND PLAN        1. Essential hypertension  Controlled, continue current treatment.   GFR was 59, advised to increase fluid intake, avoid NSAIDs.  - BASIC METABOLIC PANEL; Future    2. Dyslipidemia  Pending labs, continue current treatment  - LIPID PANEL; Future    3. Thyroid nodule  Reviewed the last, recent ultrasound showing small nodules.   It will be followed further.    4. Headache, hypnic  Controlled, continue current treatment    5. IFG (impaired fasting glucose)  Discussed about risk to develop DM.   Advised low carb diet, exercise, watch for WT.   - HEMOGLOBIN A1C; Future    6. Chronic bilateral low back pain with left-sided sciatica  Advised to continue activity as tolerated.   Requested referral:  - REFERRAL TO CHIROPRACTIC    7. Health care maintenance  Advised TDAP    Counseling:   - Smoking:  Nonsmoker    Followup: Return in about 6 months (around 9/26/2018) for Short.    All questions are answered.    Please note that this dictation was created using voice recognition software, and that there might be errors of jenny and possibly content.

## 2018-05-14 ENCOUNTER — OFFICE VISIT (OUTPATIENT)
Dept: MEDICAL GROUP | Facility: MEDICAL CENTER | Age: 58
End: 2018-05-14
Payer: COMMERCIAL

## 2018-05-14 VITALS
TEMPERATURE: 97.8 F | HEIGHT: 66 IN | SYSTOLIC BLOOD PRESSURE: 118 MMHG | DIASTOLIC BLOOD PRESSURE: 74 MMHG | WEIGHT: 143.96 LBS | BODY MASS INDEX: 23.14 KG/M2 | HEART RATE: 70 BPM | RESPIRATION RATE: 16 BRPM | OXYGEN SATURATION: 96 %

## 2018-05-14 DIAGNOSIS — Z12.31 ENCOUNTER FOR SCREENING MAMMOGRAM FOR MALIGNANT NEOPLASM OF BREAST: ICD-10-CM

## 2018-05-14 DIAGNOSIS — Z12.11 SCREENING FOR MALIGNANT NEOPLASM OF COLON: ICD-10-CM

## 2018-05-14 DIAGNOSIS — Z00.00 HEALTH CARE MAINTENANCE: ICD-10-CM

## 2018-05-14 DIAGNOSIS — R19.8 ALTERNATING CONSTIPATION AND DIARRHEA: ICD-10-CM

## 2018-05-14 DIAGNOSIS — J30.2 SEASONAL ALLERGIC RHINITIS, UNSPECIFIED TRIGGER: ICD-10-CM

## 2018-05-14 PROCEDURE — 99214 OFFICE O/P EST MOD 30 MIN: CPT | Performed by: INTERNAL MEDICINE

## 2018-05-14 RX ORDER — FEXOFENADINE HCL 180 MG/1
180 TABLET ORAL DAILY
Qty: 90 TAB | Refills: 1 | Status: SHIPPED | OUTPATIENT
Start: 2018-05-14 | End: 2019-03-27

## 2018-05-14 NOTE — PROGRESS NOTES
CHIEF COMPLAINT  Constipation/diarrhea    HPI  Patient is a 57 y.o. female patient who presents today for the following     Alternating diarrhea and constipation  New problem  Onset: 1-2 months ago  C/o:    - alternating diarrhea and constipation   - accompanied with occasional cramps    Denies:  - mucus or blood in a stool  - fever, chills  - belching/burping, heartburn  - change in appetite  - milk intake  - recent travel  - recent abx use    Seasonal allergies  Onset: since her mid/late 30', when moved to [place]  Sx: rhinitis   Course:   - stable, except with some chest congestion for the last 1-2 months  Meds: claritin  FH: neg    Reviewed PMH, PSH, FH, SH, ALL, HCM/IMM, no changes  Reviewed MEDS, no changes    Patient Active Problem List    Diagnosis Date Noted   • Sleep disorder 01/29/2018   • Fuchs' corneal dystrophy 01/23/2018   • Health care maintenance 10/12/2017   • Essential hypertension 10/12/2017   • Dyslipidemia 10/12/2017   • Hypnic headache 10/12/2017   • Thyroid nodule 10/12/2017   • Giant cell tumor of bone      CURRENT MEDICATIONS  Current Outpatient Prescriptions   Medication Sig Dispense Refill   • atorvastatin (LIPITOR) 10 MG Tab Take 1 Tab by mouth every day. 90 Tab 0   • trazodone (DESYREL) 50 MG Tab Take 1 Tab by mouth at bedtime as needed for Sleep. 30 Tab 1   • indomethacin SR (INDOCIN SR) 75 MG Cap CR Take 1 Cap by mouth every day. 30 Cap 2   • propranolol (INDERAL) 20 MG Tab Take 1 Tab by mouth 2 times a day. 60 Tab 5   • sumatriptan (IMITREX) 100 MG tablet Take 100 mg by mouth Once PRN for Migraine.       No current facility-administered medications for this visit.      ALLERGIES  Allergies: Patient has no known allergies.  PAST MEDICAL HISTORY  Past Medical History:   Diagnosis Date   • Fuchs' corneal dystrophy 1/23/2018   • Giant cell tumor of bone 2006    removal from L distal femur   • Hypertension    • Hypnic headache    • Migraine    • Thyroid nodule      SURGICAL HISTORY  She   "has a past surgical history that includes fusion () and abdominal hysterectomy total ().  SOCIAL HISTORY  Social History   Substance Use Topics   • Smoking status: Never Smoker   • Smokeless tobacco: Never Used   • Alcohol use Yes      Comment: 1 per month     Social History     Social History Narrative   • No narrative on file     FAMILY HISTORY  Family History   Problem Relation Age of Onset   • Cancer Mother      breast   • Cancer Father      metastatic; Lung   • Heart Disease Father    • Hypertension Father    • Cancer Sister      breast   • Diabetes Sister    • Diabetes Maternal Grandmother      Family Status   Relation Status   • Mother    • Father    • Sister Alive   • Maternal Grandmother      ROS   Constitutional: Negative for fever, chills.  HENT: Negative for congestion, sore throat.  Eyes: Negative for blurred vision.   Respiratory: Negative for cough, shortness of breath.  Cardiovascular: Negative for chest pain, palpitations.   Gastrointestinal: as above.  Genitourinary: Negative for dysuria.  Musculoskeletal: Negative for significant myalgias, back pain and joint pain.   Skin: Negative for rash and itching.   Neuro: Negative for dizziness, weakness and headaches.   Endo/Heme/Allergies: Does not bruise/bleed easily.   Psychiatric/Behavioral: Negative for depression, anxiety    PHYSICAL EXAM   Blood pressure 118/74, pulse 70, temperature 36.6 °C (97.8 °F), resp. rate 16, height 1.676 m (5' 6\"), weight 65.3 kg (143 lb 15.4 oz), SpO2 96 %. Body mass index is 23.24 kg/m².  General:  NAD, well appearing  HEENT:   NC/AT, PERRLA, EOMI, TMs are clear. Oropharyngeal mucosa is pink,  without lesions;  no cervical / supraclavicular  lymphadenopathy, no thyromegaly.    Cardiovascular: RRR.   No m/r/g. No carotid bruits .      Lungs:   CTAB, no w/r/r, no respiratory distress.  Abdomen: Soft, NT/ND + BS; no suprapubic tenderness; no masses or hepatosplenomegaly.  Extremities:  2+ DP and " radial pulses bilaterally.  No c/c/e.   Skin:  Warm, dry.  No erythema. No rash.   Neurologic: Alert & oriented x 3.  No focal deficits.  Psychiatric:  Affect normal, mood normal, judgment normal.    LABS     Labs are reviewed and discussed with a patient  - Fasting blood sugar 107  - BMP: GFR 59  - TSH 2.01  - FT4 1.1    IMAGING     None    ASSESMENT AND PLAN        1. Alternating constipation and diarrhea  Exam was benign, no red flags.    Advised patient to increase fiber and fluid intake and to start probiotics 2-3 times a day, OTC    - CULTURE STOOL; Future  - COMPLETE O&P; Future  - REFERRAL TO GASTROENTEROLOGY  - CDIFF BY PCR; Future    2. Seasonal allergic rhinitis, unspecified trigger  Advised to switch from Claritin to Allegra:  - fexofenadine (ALLEGRA) 180 MG tablet; Take 1 Tab by mouth every day.  Dispense: 90 Tab; Refill: 1    3. Health care maintenance  Pending colonoscopy and mammogram    4. Screening for malignant neoplasm of colon  - REFERRAL TO GASTROENTEROLOGY    5. Encounter for screening mammogram for malignant neoplasm of breast  - [id number]; Future    Counseling:   - Smoking:  Nonsmoker    Followup: Return in about 6 months (around 11/14/2018) for Short.    All questions are answered.    Please note that this dictation was created using voice recognition software, and that there might be errors of jenny and possibly content.

## 2018-05-16 RX ORDER — ATORVASTATIN CALCIUM 10 MG/1
TABLET, FILM COATED ORAL
Qty: 90 TAB | Refills: 3 | Status: SHIPPED | OUTPATIENT
Start: 2018-05-16 | End: 2018-07-23 | Stop reason: SDUPTHER

## 2018-06-06 ENCOUNTER — HOSPITAL ENCOUNTER (OUTPATIENT)
Dept: RADIOLOGY | Facility: MEDICAL CENTER | Age: 58
End: 2018-06-06

## 2018-06-15 DIAGNOSIS — I10 ESSENTIAL HYPERTENSION: ICD-10-CM

## 2018-06-15 RX ORDER — PROPRANOLOL HYDROCHLORIDE 20 MG/1
TABLET ORAL
Qty: 180 TAB | Refills: 1 | Status: SHIPPED | OUTPATIENT
Start: 2018-06-15 | End: 2018-07-23 | Stop reason: SDUPTHER

## 2018-06-18 ENCOUNTER — APPOINTMENT (OUTPATIENT)
Dept: RADIOLOGY | Facility: MEDICAL CENTER | Age: 58
End: 2018-06-18
Attending: INTERNAL MEDICINE
Payer: COMMERCIAL

## 2018-07-16 LAB — HBA1C MFR BLD: 5.4 % (ref ?–5.8)

## 2018-07-19 RX ORDER — TOPIRAMATE 25 MG/1
TABLET ORAL
COMMUNITY
End: 2018-07-23

## 2018-07-19 RX ORDER — NITROFURANTOIN 25; 75 MG/1; MG/1
CAPSULE ORAL
COMMUNITY
End: 2018-07-23

## 2018-07-19 RX ORDER — BENZONATATE 100 MG/1
CAPSULE ORAL
COMMUNITY
End: 2018-07-23

## 2018-07-19 RX ORDER — CYCLOBENZAPRINE HCL 10 MG
TABLET ORAL
COMMUNITY
End: 2018-07-23

## 2018-07-19 RX ORDER — SUMATRIPTAN 100 MG/1
TABLET, FILM COATED ORAL
COMMUNITY
End: 2018-09-18

## 2018-07-19 RX ORDER — HYDROCODONE BITARTRATE AND ACETAMINOPHEN 5; 325 MG/1; MG/1
TABLET ORAL
COMMUNITY
End: 2018-07-23

## 2018-07-19 RX ORDER — AZITHROMYCIN 250 MG/1
TABLET, FILM COATED ORAL
COMMUNITY
End: 2018-07-23

## 2018-07-19 RX ORDER — INDOMETHACIN 75 MG/1
CAPSULE, EXTENDED RELEASE ORAL
COMMUNITY
End: 2018-07-23

## 2018-07-19 RX ORDER — RIZATRIPTAN BENZOATE 10 MG/1
TABLET, ORALLY DISINTEGRATING ORAL
COMMUNITY
End: 2018-07-23

## 2018-07-19 RX ORDER — UREA 40 %
CREAM (GRAM) TOPICAL
COMMUNITY
End: 2019-09-13

## 2018-07-19 RX ORDER — HYDROCODONE BITARTRATE AND ACETAMINOPHEN 10; 325 MG/1; MG/1
TABLET ORAL
COMMUNITY
End: 2018-07-23

## 2018-07-19 RX ORDER — ATORVASTATIN CALCIUM 10 MG/1
TABLET, FILM COATED ORAL
COMMUNITY
End: 2018-09-18

## 2018-07-23 ENCOUNTER — OFFICE VISIT (OUTPATIENT)
Dept: MEDICAL GROUP | Facility: MEDICAL CENTER | Age: 58
End: 2018-07-23
Payer: COMMERCIAL

## 2018-07-23 VITALS
HEART RATE: 60 BPM | WEIGHT: 142.64 LBS | SYSTOLIC BLOOD PRESSURE: 110 MMHG | OXYGEN SATURATION: 98 % | BODY MASS INDEX: 22.92 KG/M2 | TEMPERATURE: 97.6 F | HEIGHT: 66 IN | DIASTOLIC BLOOD PRESSURE: 64 MMHG

## 2018-07-23 DIAGNOSIS — E78.5 DYSLIPIDEMIA: ICD-10-CM

## 2018-07-23 DIAGNOSIS — Z00.00 HEALTH CARE MAINTENANCE: ICD-10-CM

## 2018-07-23 DIAGNOSIS — Z12.11 COLON CANCER SCREENING: ICD-10-CM

## 2018-07-23 DIAGNOSIS — I10 ESSENTIAL HYPERTENSION: ICD-10-CM

## 2018-07-23 DIAGNOSIS — G44.81 HYPNIC HEADACHE: ICD-10-CM

## 2018-07-23 DIAGNOSIS — E04.1 THYROID NODULE: ICD-10-CM

## 2018-07-23 PROCEDURE — 99214 OFFICE O/P EST MOD 30 MIN: CPT | Performed by: INTERNAL MEDICINE

## 2018-07-23 RX ORDER — SUMATRIPTAN 100 MG/1
100 TABLET, FILM COATED ORAL
Qty: 10 TAB | Refills: 3 | Status: SHIPPED | OUTPATIENT
Start: 2018-07-23 | End: 2019-09-13 | Stop reason: SDUPTHER

## 2018-07-23 RX ORDER — ATORVASTATIN CALCIUM 10 MG/1
TABLET, FILM COATED ORAL
Qty: 90 TAB | Refills: 3 | Status: SHIPPED | OUTPATIENT
Start: 2018-07-23 | End: 2018-09-18

## 2018-07-23 RX ORDER — PROPRANOLOL HYDROCHLORIDE 20 MG/1
TABLET ORAL
Qty: 225 TAB | Refills: 1 | Status: SHIPPED | OUTPATIENT
Start: 2018-07-23 | End: 2018-09-18 | Stop reason: SDUPTHER

## 2018-07-23 RX ORDER — ATORVASTATIN CALCIUM 10 MG/1
TABLET, FILM COATED ORAL
Qty: 90 TAB | Refills: 3 | Status: SHIPPED | OUTPATIENT
Start: 2018-07-23 | End: 2019-09-13 | Stop reason: SDUPTHER

## 2018-07-23 NOTE — PROGRESS NOTES
CHIEF COMPLAINT  Chief Complaint   Patient presents with   • Follow-Up     labs   • Medication Refill     imitrex     HPI  Patient is a 57 y.o. female patient who presents today for the following     HYPERTENSION  Meds: propranolol, 20 mg BID, taking as prescribed.   Measuring BP at home: yes,, has jacques < 140/90.  Denies:  -  headaches, vision problems, tinnitus.                 -  chest pain/pressure, palpitations, irregular heart beats, exertional, dyspnea, peripheral edema.                           - medication side effect: unusual fatigue, slow heartbeat, foot/leg swelling, cough.  Low salt diet: no  Diet: regular  Exercise: > 4 days a week  BMI: Body mass index is 23 kg/m².  FH of HTN: father     DYSLIPIDEMIA  Meds: lipitor 10 mh, taking daily, as prescribed. No myalgias, muscle cramps or pain.   Diet /Exercise:  As above  BMI: 23  FH: father     Thyroid nodules, multiple  Stable, no change.   The last US in 3/2018.  - nodule 1 cm, largest  TSH was normal.       Hypnic HA  Interval course:  - increased frequency recently  - used imitrex, helped, needs refill    Sx: at the age of 48  Dg: ~ 5 yrs  Meds: on indomethacin 75 mg QD.     Onset: after he age of 47 y/o  The patient reports  left temporal headaches, described as: moderate in severity , dull with no nausea, emesis, photophobia or aura, without radiation.  Usual frequency is once a month.  Aura is not present.  Headaches are relieved by sumatriptan, indomethacin, propranolol as prophylaxis  Known triggers include: none.   Current stressors include: none  Previous treatment and prevention include: none     Pertinent negatives:  Denies difficulty with speech or swallowing, facial numbness or tingling, focal weakness. Denies sore throat or cough, fever, sinus congestion, ear pain, tooth clenching or grinding.     Family Hx: no known family members with significant headaches  Prior imaging: yes    Reviewed PMH, PSH, FH, SH, ALL, HCM/IMM, no changes  Reviewed  MEDS, no changes    Patient Active Problem List    Diagnosis Date Noted   • Sleep disorder 01/29/2018   • Fuchs' corneal dystrophy 01/23/2018   • Health care maintenance 10/12/2017   • Essential hypertension 10/12/2017   • Dyslipidemia 10/12/2017   • Hypnic headache 10/12/2017   • Thyroid nodule 10/12/2017   • Giant cell tumor of bone      CURRENT MEDICATIONS  Current Outpatient Prescriptions   Medication Sig Dispense Refill   • atorvastatin (LIPITOR) 10 MG Tab TAKE 1 TABLET BY MOUTH ONCE DAILY 90 Tab 3   • propranolol (INDERAL) 20 MG Tab TAKE 1.5 TABLET BY MOUTH TWICE DAILY 225 Tab 1   • atorvastatin (LIPITOR) 10 MG Tab TAKE 1 TABLET BY MOUTH ONCE DAILY 90 Tab 3   • sumatriptan (IMITREX) 100 MG tablet Take 1 Tab by mouth Once PRN for Migraine. 10 Tab 3   • Ospemifene (OSPHENA) 60 MG Tab Osphena 60 mg tablet     • urea 40 % Cream urea 40 % topical cream     • atorvastatin (LIPITOR) 10 MG Tab atorvastatin 10 mg tablet     • sumatriptan (IMITREX) 100 MG tablet sumatriptan 100 mg tablet     • fexofenadine (ALLEGRA) 180 MG tablet Take 1 Tab by mouth every day. 90 Tab 1   • trazodone (DESYREL) 50 MG Tab Take 1 Tab by mouth at bedtime as needed for Sleep. 30 Tab 1   • indomethacin SR (INDOCIN SR) 75 MG Cap CR Take 1 Cap by mouth every day. 30 Cap 2     No current facility-administered medications for this visit.      ALLERGIES  Allergies: Patient has no known allergies.  PAST MEDICAL HISTORY  Past Medical History:   Diagnosis Date   • Fuchs' corneal dystrophy 1/23/2018   • Giant cell tumor of bone 2006    removal from L distal femur   • Hypertension    • Hypnic headache    • Migraine    • Thyroid nodule      SURGICAL HISTORY  She  has a past surgical history that includes fusion (2004) and abdominal hysterectomy total (1985).  SOCIAL HISTORY  Social History   Substance Use Topics   • Smoking status: Never Smoker   • Smokeless tobacco: Never Used   • Alcohol use Yes      Comment: 1 per month     Social History     Social  "History Narrative   • No narrative on file     FAMILY HISTORY  Family History   Problem Relation Age of Onset   • Cancer Mother      breast   • Cancer Father      metastatic; Lung   • Heart Disease Father    • Hypertension Father    • Cancer Sister      breast   • Diabetes Sister    • Diabetes Maternal Grandmother      Family Status   Relation Status   • Mother    • Father    • Sister Alive   • Maternal Grandmother        ROS   Constitutional: Negative for fever, chills.  HENT: Negative for congestion, sore throat.  Eyes: Negative for blurred vision.   Respiratory: Negative for cough, shortness of breath.  Cardiovascular: Negative for chest pain, palpitations. And per HPI.  Gastrointestinal: Negative for heartburn, nausea, abdominal pain.   Genitourinary: Negative for dysuria.  Musculoskeletal: Negative for significant myalgias, back pain and joint pain.   Skin: Negative for rash and itching.   Neuro: Negative for dizziness, weakness and as above.   Endo/Heme/Allergies: Does not bruise/bleed easily. And per HPI.  Psychiatric/Behavioral: Negative for depression, anxiety    PHYSICAL EXAM   Blood pressure 110/64, pulse 60, temperature 36.4 °C (97.6 °F), height 1.676 m (5' 6\"), weight 64.7 kg (142 lb 10.2 oz), SpO2 98 %. Body mass index is 23.02 kg/m².  General:  NAD, well appearing  HEENT:   NC/AT, PERRLA, EOMI, TMs are clear. Oropharyngeal mucosa is pink,  without lesions;  no cervical / supraclavicular  lymphadenopathy, no thyromegaly.    Cardiovascular: RRR.   No m/r/g. No carotid bruits .      Lungs:   CTAB, no w/r/r, no respiratory distress.  Abdomen: Soft, NT/ND + BS; no suprapubic tenderness; no masses or hepatosplenomegaly.  Extremities:  2+ DP and radial pulses bilaterally.  No c/c/e.   Skin:  Warm, dry.  No erythema. No rash.   Neurologic: Alert & oriented x 3.  No focal deficits.  Psychiatric:  Affect normal, mood normal, judgment normal.    LABS     Labs are reviewed and discussed with a " patient, 7/16/18:  1. BMP: Within normal limits  2. Lipid panel: Total cholesterol 168, HDL 54, triglycerides 78, LDL 97     Lab Results   Component Value Date/Time    HBA1C 5.4 07/16/2018     IMAGING     Reviewed the last thyroid ultrasound from 3/8/18:  The thyroid gland is homogeneous.  Vascularity is normal.  The right lobe of the thyroid gland measures 1.17 cm x 4.12 cm x 1.45 cm.  The left lobe of the thyroid gland measures 1.05 cm x 3.97 cm x 1.76 cm.  The isthmus measures 0.13 cm.    Hypoechoic nodule in inferior right lobe thyroid gland measures 6.5 x 4.1 x 6.7 mm. Additional hypoechoic nodule is noted in the right side of the thyroid isthmus measuring 6.8 x 1.7 x 5.0 mm. Nodule in left lobe lower pole measures 10.5 x 6.1 x 5.4 mm.    ASSESMENT AND PLAN        1. Essential hypertension  Controlled, continue current treatment  - COMP METABOLIC PANEL; Future    2. Dyslipidemia  Controlled, continue current treatment  - atorvastatin (LIPITOR) 10 MG Tab; TAKE 1 TABLET BY MOUTH ONCE DAILY  Dispense: 90 Tab; Refill: 3  - LIPID PROFILE; Future    3. Thyroid nodule  Stable, continue follow-up PFT, ultrasound  - US-SOFT TISSUES OF HEAD - NECK; Future  - TSH; Future    4. Hypnic headache  Increased frequency, increase propranolol, as below  - propranolol (INDERAL) 20 MG Tab; TAKE 1.5 TABLET BY MOUTH TWICE DAILY  Dispense: 225 Tab; Refill: 1  - sumatriptan (IMITREX) 100 MG tablet; Take 1 Tab by mouth Once PRN for Migraine.  Dispense: 10 Tab; Refill: 3    5. Health care maintenance  UTD    6. Colon cancer screening  - OCCULT BLOOD FECES IMMUNOASSAY; Future    Counseling:   - Smoking:  Nonsmoker    Followup: Return in about 6 months (around 1/23/2019) for Short.    All questions are answered.    Please note that this dictation was created using voice recognition software, and that there might be errors of jenny and possibly content.

## 2018-09-18 ENCOUNTER — OFFICE VISIT (OUTPATIENT)
Dept: INTERNAL MEDICINE | Facility: MEDICAL CENTER | Age: 58
End: 2018-09-18
Payer: COMMERCIAL

## 2018-09-18 VITALS
TEMPERATURE: 98 F | HEART RATE: 71 BPM | BODY MASS INDEX: 22.82 KG/M2 | OXYGEN SATURATION: 97 % | SYSTOLIC BLOOD PRESSURE: 112 MMHG | WEIGHT: 142 LBS | DIASTOLIC BLOOD PRESSURE: 62 MMHG | HEIGHT: 66 IN

## 2018-09-18 DIAGNOSIS — H18.519 FUCHS' CORNEAL DYSTROPHY: ICD-10-CM

## 2018-09-18 DIAGNOSIS — G44.81 HYPNIC HEADACHE: ICD-10-CM

## 2018-09-18 DIAGNOSIS — Z00.00 HEALTH CARE MAINTENANCE: ICD-10-CM

## 2018-09-18 DIAGNOSIS — Z23 NEED FOR INFLUENZA VACCINATION: ICD-10-CM

## 2018-09-18 DIAGNOSIS — E78.5 DYSLIPIDEMIA: ICD-10-CM

## 2018-09-18 DIAGNOSIS — G43.809 OTHER MIGRAINE WITHOUT STATUS MIGRAINOSUS, NOT INTRACTABLE: ICD-10-CM

## 2018-09-18 DIAGNOSIS — E04.1 THYROID NODULE: ICD-10-CM

## 2018-09-18 DIAGNOSIS — I10 ESSENTIAL HYPERTENSION: ICD-10-CM

## 2018-09-18 PROCEDURE — 90471 IMMUNIZATION ADMIN: CPT | Performed by: INTERNAL MEDICINE

## 2018-09-18 PROCEDURE — 99204 OFFICE O/P NEW MOD 45 MIN: CPT | Mod: 25 | Performed by: INTERNAL MEDICINE

## 2018-09-18 PROCEDURE — 90686 IIV4 VACC NO PRSV 0.5 ML IM: CPT | Performed by: INTERNAL MEDICINE

## 2018-09-18 RX ORDER — INDOMETHACIN 75 MG/1
75 CAPSULE, EXTENDED RELEASE ORAL DAILY
Qty: 30 CAP | Refills: 3 | Status: SHIPPED
Start: 2018-09-18 | End: 2019-03-01 | Stop reason: SDUPTHER

## 2018-09-18 RX ORDER — PROPRANOLOL HYDROCHLORIDE 20 MG/1
30 TABLET ORAL 2 TIMES DAILY
Qty: 90 TAB | Refills: 3 | Status: SHIPPED
Start: 2018-09-18 | End: 2019-03-26

## 2018-09-18 ASSESSMENT — PATIENT HEALTH QUESTIONNAIRE - PHQ9: CLINICAL INTERPRETATION OF PHQ2 SCORE: 0

## 2018-09-18 ASSESSMENT — PAIN SCALES - GENERAL: PAINLEVEL: NO PAIN

## 2018-09-18 NOTE — NON-PROVIDER
"Maryann Ashley is a 57 y.o. female here for a non-provider visit for:   FLU    Reason for immunization: Annual Flu Vaccine  Immunization records indicate need for vaccine: Yes, confirmed with Epic  Minimum interval has been met for this vaccine: Yes  ABN completed: Not Indicated    Order and dose verified by: VANDANA  VIS Dated  08/07/15 was given to patient: Yes  All IAC Questionnaire questions were answered \"No.\"    Patient tolerated injection and no adverse effects were observed or reported: Yes    Pt scheduled for next dose in series: Not Indicated    "

## 2018-09-18 NOTE — PROGRESS NOTES
Maryann Ashley is a 57 y.o. female who is here to Establish care and is new to the clinic.     CC: Establish care, dyslipidemia, migraine headaches    HPI:    Patient is a 57-year-old female who is here to establish care today. Patient is switching from another provider. She has a past medical history of hypertension, dyslipidemia, migraine headaches, thyroid nodule, and tachypneic headaches. She doesn't have any overt complaints today and wants refills on her medications. For her history of dyslipidemia she is on Lipitor 10 mg daily. She says she is tolerating the medication well and doing good on this. She also has a history of hypertension as well as migraine headaches. She says she is now on propranolol 30 mg twice a day for prophylactic therapy for her migraines and this helps her blood pressure as well. She has been on this medication for about one year now. She says ever since she's been on this medication, she only gets headaches maybe once every 3 months or so. She says she is doing so much better and wants to continue on this regimen. She does have Imitrex prescribed in case she does get a migraine headache but she said she rarely takes it now. Patient also mentions a history of hypnic headaches and she says she diagnosed those on her own because they happen in the middle of the night and she has to wake up because of the severity. She takes indomethacin for those headaches and she says they rarely occur now. She does not want any of these medications to be changed at this time.    She did recently had lab work done. She says she was told her blood glucose was high and diabetes does run in her family so she was concerned about that but then repeat blood work was normal. She also has a history of a thyroid nodule for which she supposed to be getting all sound done soon. She was sent to get a biopsy but she was told that the nodule was really small to obtain a biopsy. She also has a history of  insomnia for which she takes trazodone on an as-needed basis. She says she does not take the medication on a daily basis but she feels like she has not slept well for 2-3 days, she tends to take a dose at that time. She also takes fexofenadine for allergies.    In regards to her past surgical history, patient had a history of giant cell tumor of bone which was resected. She's had issues with her left knee since that time. She also mentions her left ankle get swollen but it's not something she is overtly concerned about. She also has a history of total hysterectomy. Her family history is significant for breast cancer and her sister also passed away from it.    In regards to healthcare maintenance, she had a mammogram done in June. She no longer has Pap smears done. Her colonoscopy was this year. She is interested in getting the influenza vaccine and wants refills on her medications.      No problem-specific Assessment & Plan notes found for this encounter.      She  has a past medical history of Fuchs' corneal dystrophy (1/23/2018); Giant cell tumor of bone (2006); Hypertension; Hypnic headache; Migraine; and Thyroid nodule.    Patient Active Problem List    Diagnosis Date Noted   • Sleep disorder 01/29/2018   • Fuchs' corneal dystrophy 01/23/2018   • Health care maintenance 10/12/2017   • Essential hypertension 10/12/2017   • Dyslipidemia 10/12/2017   • Hypnic headache 10/12/2017   • Thyroid nodule 10/12/2017   • Giant cell tumor of bone        Allergies:Patient has no known allergies.    Current Outpatient Prescriptions   Medication Sig Dispense Refill   • indomethacin SR (INDOCIN SR) 75 MG Cap CR Take 1 Cap by mouth every day. 30 Cap 3   • propranolol (INDERAL) 20 MG Tab Take 1.5 Tabs by mouth 2 times a day. TAKE 1.5 TABLET BY MOUTH TWICE DAILY 90 Tab 3   • atorvastatin (LIPITOR) 10 MG Tab TAKE 1 TABLET BY MOUTH ONCE DAILY (Patient taking differently: Take 10 mg by mouth every day. TAKE 1 TABLET BY MOUTH ONCE  "DAILY) 90 Tab 3   • fexofenadine (ALLEGRA) 180 MG tablet Take 1 Tab by mouth every day. 90 Tab 1   • sumatriptan (IMITREX) 100 MG tablet Take 1 Tab by mouth Once PRN for Migraine. 10 Tab 3   • urea 40 % Cream urea 40 % topical cream     • trazodone (DESYREL) 50 MG Tab Take 1 Tab by mouth at bedtime as needed for Sleep. 30 Tab 1     No current facility-administered medications for this visit.        Social History   Substance Use Topics   • Smoking status: Never Smoker   • Smokeless tobacco: Never Used   • Alcohol use Yes      Comment: 1 per month       Family History   Problem Relation Age of Onset   • Cancer Mother         breast   • Cancer Father         metastatic; Lung   • Heart Disease Father    • Hypertension Father    • Stroke Father    • Cancer Sister         breast   • Diabetes Sister    • Diabetes Maternal Grandmother        Review of Systems:   Pertinent positives as stated in HPI, all others reviewed as negative.    Physical Exam:  Blood pressure 112/62, pulse 71, temperature 36.7 °C (98 °F), height 1.676 m (5' 6\"), weight 64.4 kg (142 lb), SpO2 97 %, not currently breastfeeding. Body mass index is 22.92 kg/m².    General Appearance: healthy, alert, no distress, cooperative  Skin: No rashes or lesions.  Head: Normocephalic. No masses appreciated.   Eyes: PERRLA.  Ears: External ears normal.  Nose/Sinuses: Nares normal. Mucosa normal.   Oropharynx:  Oropharynx moist and without lesion.  Neck: Neck supple. No adenopathy.   Lungs: Lungs clear to auscultation bilaterally.  Heart: RRR without murmur, gallop, or rubs.  Abdomen: Soft, non-tender. BS normal. No masses,  No organomegaly  Musculoskeletal: Extremities: Upper and lower extremities appear normal. No deformities, edema.   Peripheral Pulses: Pulses: radial=4/4, dorsalis pedis=4/4  Neurologic: Cranial nerves intact.   Psychiatric: Mood appears normal.     Assessment/Plan:     1. Dyslipidemia  Continue on Lipitor.  No acute complaints.  Tolerating " medication well.  Reviewed lipid panel done by previous PCP and discussed with the patient.     2. Essential hypertension  BP normal today.  On Propranolol for migraine prophylaxis which probably helps her BP as well.    3. Fuchs' corneal dystrophy  Sees Ophthalmology.  No vision concerns at this time.     4. Health care maintenance  Up to date on screening.  Labs done recently.   Needs influenza.     5. Hypnic headache  Patient says she self diagnosed and Indocin has been a life changer for her.  No longer waking up frequently with the headaches.   - indomethacin SR (INDOCIN SR) 75 MG Cap CR; Take 1 Cap by mouth every day.  Dispense: 30 Cap; Refill: 3  - propranolol (INDERAL) 20 MG Tab; Take 1.5 Tabs by mouth 2 times a day. TAKE 1.5 TABLET BY MOUTH TWICE DAILY  Dispense: 90 Tab; Refill: 3    6. Thyroid nodule  Will obtain US neck.  - US-SOFT TISSUES OF HEAD - NECK; Future    7. Need for influenza vaccination  - INFLUENZA VACCINE QUAD INJ >3Y(PF)    8. Other migraine without status migrainosus, not intractable  Rarely gets migraines now.  Doing well on the Inderal. Continue.   - propranolol (INDERAL) 20 MG Tab; Take 1.5 Tabs by mouth 2 times a day. TAKE 1.5 TABLET BY MOUTH TWICE DAILY  Dispense: 90 Tab; Refill: 3      Followup: Return in about 6 months (around 3/18/2019), or if symptoms worsen or fail to improve.    This note was created using voice recognition software. There may be unintended errors spelling, and grammar or content.

## 2018-10-02 ENCOUNTER — HOSPITAL ENCOUNTER (OUTPATIENT)
Dept: RADIOLOGY | Facility: MEDICAL CENTER | Age: 58
End: 2018-10-02
Attending: INTERNAL MEDICINE
Payer: COMMERCIAL

## 2018-10-02 DIAGNOSIS — E04.1 THYROID NODULE: ICD-10-CM

## 2018-10-02 PROCEDURE — 76536 US EXAM OF HEAD AND NECK: CPT

## 2018-10-11 ENCOUNTER — APPOINTMENT (OUTPATIENT)
Dept: OTHER | Facility: IMAGING CENTER | Age: 58
End: 2018-10-11

## 2018-10-12 ENCOUNTER — PATIENT MESSAGE (OUTPATIENT)
Dept: INTERNAL MEDICINE | Facility: MEDICAL CENTER | Age: 58
End: 2018-10-12

## 2018-10-12 NOTE — TELEPHONE ENCOUNTER
From: Maryann Ashley  To: Malcom Parsons M.D.  Sent: 10/12/2018 4:45 PM PDT  Subject: Test Result Question    I have a question about US-SOFT TISSUES OF HEAD - NECK resulted on 10/2/18, 9:41 AM.  I don't understand the results, it looks like a couple Nogels got bigger not by much. Can you explain the ultrasound to me? Thank you!    Maryann Ashley

## 2018-10-15 ENCOUNTER — APPOINTMENT (OUTPATIENT)
Dept: OTHER | Facility: IMAGING CENTER | Age: 58
End: 2018-10-15

## 2018-10-24 ENCOUNTER — APPOINTMENT (OUTPATIENT)
Dept: OTHER | Facility: IMAGING CENTER | Age: 58
End: 2018-10-24

## 2018-11-08 ENCOUNTER — OFFICE VISIT (OUTPATIENT)
Dept: INTERNAL MEDICINE | Facility: MEDICAL CENTER | Age: 58
End: 2018-11-08
Payer: COMMERCIAL

## 2018-11-08 VITALS
BODY MASS INDEX: 23.16 KG/M2 | TEMPERATURE: 97.4 F | OXYGEN SATURATION: 96 % | HEART RATE: 71 BPM | HEIGHT: 66 IN | SYSTOLIC BLOOD PRESSURE: 110 MMHG | WEIGHT: 144.13 LBS | DIASTOLIC BLOOD PRESSURE: 60 MMHG

## 2018-11-08 DIAGNOSIS — M54.2 NECK PAIN, BILATERAL POSTERIOR: ICD-10-CM

## 2018-11-08 PROBLEM — M23.302: Status: ACTIVE | Noted: 2018-11-08

## 2018-11-08 PROBLEM — M17.9 OSTEOARTHRITIS OF KNEE: Status: ACTIVE | Noted: 2018-11-08

## 2018-11-08 PROBLEM — M18.9 OSTEOARTHRITIS OF CARPOMETACARPAL (CMC) JOINT OF THUMB: Status: ACTIVE | Noted: 2018-11-08

## 2018-11-08 PROBLEM — M67.50 PLICA SYNDROME: Status: ACTIVE | Noted: 2018-11-08

## 2018-11-08 PROCEDURE — 99214 OFFICE O/P EST MOD 30 MIN: CPT | Mod: GC | Performed by: INTERNAL MEDICINE

## 2018-11-08 NOTE — PROGRESS NOTES
"Established Patient    aMryann presents today with the following:    CC: Atypical neck and interscapular pain    HPI:     57 yr old  female , established patient of Dr Parsons, is here for an acute short visit, for a lower neck pain that she noted 4 days ago while on breakfast table on Sunday morning.  It was 6/10 intensity vague achy pain in the lower neck and interscapular region posteriorly, which lasted 15 minutes and subsided on its own with no aggravating or relieving factors.  After breakfast she went to bed, and again had similar pain for 10-15 minutes which subsided on its own.  She is not a smoker, nonalcoholic, nor diabetic.    She declined any chest pain, any jaw pain, or heartburn or shortness of breath or blurry vision or sweating.  She never had coronary artery disease or any stroke in the past.  She endorses to history of chronic migraine attacks, and hypnic headaches (\"alarm clock\" headache) which relieve with indomethacin , propranolol, which she uses mainly for migraine prophylaxis as well as for borderline hypertension under control with propranolol 20 mg.  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  She takes atorvastatin 10 mg for her dyslipidemia.    Patient walks briskly for 2-3 miles every day without any exertional angina      Her father had heart attack in 63 years of age.     She does not have any personal history of coronary artery disease, or cancers.  Per patient, her EKG was normal when she had her wellness examination done at [facility] few months ago, where she used to have her PCP and regular medical checkups.          Patient Active Problem List    Diagnosis Date Noted   • Sleep disorder 01/29/2018   • Fuchs' corneal dystrophy 01/23/2018   • Health care maintenance 10/12/2017   • Essential hypertension 10/12/2017   • Dyslipidemia 10/12/2017   • Hypnic " "headache 10/12/2017   • Thyroid nodule 10/12/2017   • Giant cell tumor of bone        Current Outpatient Prescriptions   Medication Sig Dispense Refill   • indomethacin SR (INDOCIN SR) 75 MG Cap CR Take 1 Cap by mouth every day. 30 Cap 3   • propranolol (INDERAL) 20 MG Tab Take 1.5 Tabs by mouth 2 times a day. TAKE 1.5 TABLET BY MOUTH TWICE DAILY 90 Tab 3   • atorvastatin (LIPITOR) 10 MG Tab TAKE 1 TABLET BY MOUTH ONCE DAILY (Patient taking differently: Take 10 mg by mouth every day. TAKE 1 TABLET BY MOUTH ONCE DAILY) 90 Tab 3   • sumatriptan (IMITREX) 100 MG tablet Take 1 Tab by mouth Once PRN for Migraine. 10 Tab 3   • urea 40 % Cream urea 40 % topical cream. PRN     • trazodone (DESYREL) 50 MG Tab Take 1 Tab by mouth at bedtime as needed for Sleep. 30 Tab 1   • fexofenadine (ALLEGRA) 180 MG tablet Take 1 Tab by mouth every day. (Patient not taking: Reported on 11/8/2018) 90 Tab 1     No current facility-administered medications for this visit.        ROS: As per HPI. Additional pertinent symptoms as noted below.    Negative for GERD  Negative for chest pain  Negative for shortness of breath  Negative for blurry vision  Negative for swelling of feet  Negative for depression    /60 (BP Location: Right arm, Patient Position: Sitting, BP Cuff Size: Adult)   Pulse 71   Temp 36.3 °C (97.4 °F) (Temporal)   Ht 1.676 m (5' 6\")   Wt 65.4 kg (144 lb 2 oz)   SpO2 96%   BMI 23.26 kg/m²     Physical Exam   Constitutional:  oriented to person, place, and time. No distress.   Eyes: Pupils are equal, round, and reactive to light. No scleral icterus.    Neck: Neck supple. No thyromegaly present.   Movements of the cervical spine normal  Mild tenderness over the trapezius borders bilaterally  No sensorimotor deficits in the upper extremities  No hyperthenar muscle wasting    Cardiovascular:   Normal rate, regular rhythm and normal heart sounds.    Exam reveals no gallop and no friction rub.  No murmur " heard.    Pulmonary/Chest:   Breath sounds normal. Chest wall is not dull to percussion.     Musculoskeletal:   no edema.   Lymphadenopathy: no cervical adenopathy  Neurological: alert and oriented to person, place, and time.   Skin: No cyanosis. Nails show no clubbing.  Other:       Assessment and Plan    Lower Neck Pain Posterior  Hx of Lower neck pain that she noted 4 days ago while on breakfast table on Sunday morning.  It was 6/10 intensity vague achy pain in the lower neck and interscapular region posteriorly, which lasted 15 minutes and subsided on its own with no aggravating or relieving factors.  After breakfast she went to bed, and again had similar pain for 10-15 minutes which subsided on its own.  She is not a smoker, nonalcoholic, nor diabetic.  No strong family history of sudden cardiac death or early onset coronary artery disease  Has strong history of osteoarthritis of knee joint, Carpometacarpal joint, cervical spine  History of chronic migraine headaches and alarm clock headaches  Previous history of EKG normal at [facility] during wellness exam  Patient walks briskly for 2-3 miles every day without any exertional angina    Clinical examination shows normal vital signs and normal cardiovascular exam  Etiology very unlikely to be cardiac origin; more likely to be musculoskeletal origin  Patient advised to return to clinic if symptoms persist or go to nearest ED if chest pain or similar symptoms develop for further evaluation of cardiac workup      Follow-up with PCP    Signed by: Kayley James M.D.

## 2018-11-08 NOTE — PATIENT INSTRUCTIONS
Muscle Pain, Adult  Muscle pain (myalgia) may be mild or severe. In most cases, the pain lasts only a short time and it goes away without treatment. It is normal to feel some muscle pain after starting a workout program. Muscles that have not been used often will be sore at first.  Muscle pain may also be caused by many other things, including:  · Overuse or muscle strain, especially if you are not in shape. This is the most common cause of muscle pain.  · Injury.  · Bruises.  · Viruses, such as the flu.  · Infectious diseases.  · A chronic condition that causes muscle tenderness, fatigue, and headache (fibromyalgia).  · A condition, such as lupus, in which the body’s disease-fighting system attacks other organs in the body (autoimmune or rheumatologic diseases).  · Certain drugs, including ACE inhibitors and statins.  To diagnose the cause of your muscle pain, your health care provider will do a physical exam and ask questions about the pain and when it began. If you have not had muscle pain for very long, your health care provider may want to wait before doing much testing. If your muscle pain has lasted a long time, your health care provider may want to run tests right away. In some cases, this may include tests to rule out certain conditions or illnesses.  Treatment for muscle pain depends on the cause. Home care is often enough to relieve muscle pain. Your health care provider may also prescribe anti-inflammatory medicine.  Follow these instructions at home:  Activity  · If overuse is causing your muscle pain:  ¨ Slow down your activities until the pain goes away.  ¨ Do regular, gentle exercises if you are not usually active.  ¨ Warm up before exercising. Stretch before and after exercising. This can help lower the risk of muscle pain.  · Do not continue working out if the pain is very bad. Bad pain could mean that you have injured a muscle.  Managing pain and discomfort  · If directed, apply ice to the sore  muscle:  ¨ Put ice in a plastic bag.  ¨ Place a towel between your skin and the bag.  ¨ Leave the ice on for 20 minutes, 2-3 times a day.  · You may also alternate between applying ice and applying heat as told by your health care provider. To apply heat, use the heat source that your health care provider recommends, such as a moist heat pack or a heating pad.  ¨ Place a towel between your skin and the heat source.  ¨ Leave the heat on for 20-30 minutes.  ¨ Remove the heat if your skin turns bright red. This is especially important if you are unable to feel pain, heat, or cold. You may have a greater risk of getting burned.  Medicines  · Take over-the-counter and prescription medicines only as told by your health care provider.  · Do not drive or use heavy machinery while taking prescription pain medicine.  Contact a health care provider if:  · Your muscle pain gets worse and medicines do not help.  · You have muscle pain that lasts longer than 3 days.  · You have a rash or fever along with muscle pain.  · You have muscle pain after a tick bite.  · You have muscle pain while working out, even though you are in good physical condition.  · You have redness, soreness, or swelling along with muscle pain.  · You have muscle pain after starting a new medicine or changing the dose of a medicine.  Get help right away if:  · You have trouble breathing.  · You have trouble swallowing.  · You have muscle pain along with a stiff neck, fever, and vomiting.  · You have severe muscle weakness or cannot move part of your body.  This information is not intended to replace advice given to you by your health care provider. Make sure you discuss any questions you have with your health care provider.  Document Released: 11/09/2007 Document Revised: 07/07/2017 Document Reviewed: 05/09/2017  Elsevier Interactive Patient Education © 2017 [employer]vier Inc.

## 2019-01-09 ENCOUNTER — PATIENT MESSAGE (OUTPATIENT)
Dept: INTERNAL MEDICINE | Facility: MEDICAL CENTER | Age: 59
End: 2019-01-09

## 2019-01-09 DIAGNOSIS — M25.559 ARTHRALGIA OF HIP, UNSPECIFIED LATERALITY: ICD-10-CM

## 2019-02-04 ENCOUNTER — PATIENT MESSAGE (OUTPATIENT)
Dept: INTERNAL MEDICINE | Facility: MEDICAL CENTER | Age: 59
End: 2019-02-04

## 2019-02-04 DIAGNOSIS — M72.0 DUPUYTREN'S CONTRACTURE: ICD-10-CM

## 2019-02-04 NOTE — TELEPHONE ENCOUNTER
From: Maryann Ashley  To: Malcom Parsons M.D.  Sent: 2/4/2019 11:37 AM PST  Subject: Non-Urgent Medical Question    Hi Dr Parsons,    I just found out I need another referral with Dr. Flores of [facility]. I am already a patient of his and I have dupuytren contracture, a hand condition which may be getting worse .    Thank you!    Maryann Ashley

## 2019-02-08 ENCOUNTER — PATIENT MESSAGE (OUTPATIENT)
Dept: INTERNAL MEDICINE | Facility: MEDICAL CENTER | Age: 59
End: 2019-02-08

## 2019-02-08 DIAGNOSIS — M25.569 KNEE PAIN, UNSPECIFIED CHRONICITY, UNSPECIFIED LATERALITY: ICD-10-CM

## 2019-02-08 NOTE — TELEPHONE ENCOUNTER
From: Maryann Ashley  To: Malcom Parsons M.D.  Sent: 2/8/2019 9:38 AM PST  Subject: RE: Non-Urgent Medical Question    Dr lFores specializes in Hand/Arms and Dr Lawson specializes in knee/legs. DR Lawson is not with [facility], He is with [facility]. I am a patient of his. You would think they would all specialize in all issues but unfortunately they don't. I need a Referrel for both. I already have an appt with Dr. Flores in March, the Referrel for him came thru.    Thank you!    Maryann    ----- Message -----  From: Malcom Parsons M.D.  Sent: 2/8/19, 8:49 AM  To: Maryann Ashley  Subject: RE: Non-Urgent Medical Question    Hello,    So, I had placed a referral for you to see Dr. Flores at the [facility] but your insurance policy changes so the referral desk suggested another orthopedic according to your insurance.    Do you see two different Orthopedic physicians for your knee and the hand? I can send this referral so they may be able to manage both these issues.       ----- Message -----   From: Maryann Ashley   Sent: 2/8/2019 6:50 AM PST   To: Malcom Parsons M.D.  Subject: Non-Urgent Medical Question    Good Morning!    I hate to bother you again for another Referrel, but my knee has been bothering me   And I have a history of knee trouble. I am a patient of Dr. Michael Lawson. Can I get a Referrel to him? I believe   He is [facility].    Thank you!    Maryann Ashley

## 2019-02-08 NOTE — TELEPHONE ENCOUNTER
From: Maryann Ashley  To: Malcom Parsons M.D.  Sent: 2/8/2019 6:50 AM PST  Subject: Non-Urgent Medical Question    Good Morning!    I hate to bother you again for another Referrel, but my knee has been bothering me   And I have a history of knee trouble. I am a patient of Dr. Michael Lawson. Can I get a Referrel to him? I believe   He is [facility].    Thank you!    Maryann Ashley

## 2019-03-01 DIAGNOSIS — G44.81 HYPNIC HEADACHE: ICD-10-CM

## 2019-03-04 RX ORDER — INDOMETHACIN 75 MG/1
CAPSULE, EXTENDED RELEASE ORAL
Qty: 30 CAP | Refills: 3 | Status: SHIPPED | OUTPATIENT
Start: 2019-03-04 | End: 2019-06-27 | Stop reason: SDUPTHER

## 2019-03-04 NOTE — TELEPHONE ENCOUNTER
PT SEEN: 11/8/18 WITH Dr. RODRIGUES NEXT APPT 3/26/19 WITH Dr. MANN  Was the patient seen in the last year in this department? Yes     Does patient have an active prescription for medications requested? No     Received Request Via: Pharmacy

## 2019-03-14 ENCOUNTER — PATIENT MESSAGE (OUTPATIENT)
Dept: INTERNAL MEDICINE | Facility: MEDICAL CENTER | Age: 59
End: 2019-03-14

## 2019-03-15 NOTE — TELEPHONE ENCOUNTER
From: Maryann Ashley  To: Malcom Parsons M.D.  Sent: 3/14/2019 8:30 PM PDT  Subject: Non-Urgent Medical Question    Hi Dr. Parsons ,    I thought when I made an appt with   Dr. Flores of [facility] for my hand they said they did have a Referrel, but I just got a message from them saying I did not. My appt is on the 22nd next week. Is it possible to fax a Referrel to [facility] at [phone] before my appt.     Thank you!     Maryann Ashley

## 2019-03-18 NOTE — PATIENT COMMUNICATION
Spoke to [facility] pt care coordinator referral was sent this morning pt notified via my chart tried calling but no answer

## 2019-03-26 ENCOUNTER — OFFICE VISIT (OUTPATIENT)
Dept: INTERNAL MEDICINE | Facility: MEDICAL CENTER | Age: 59
End: 2019-03-26
Payer: COMMERCIAL

## 2019-03-26 VITALS
BODY MASS INDEX: 23.3 KG/M2 | HEART RATE: 66 BPM | TEMPERATURE: 98.6 F | SYSTOLIC BLOOD PRESSURE: 125 MMHG | OXYGEN SATURATION: 96 % | DIASTOLIC BLOOD PRESSURE: 82 MMHG | HEIGHT: 66 IN | WEIGHT: 145 LBS

## 2019-03-26 DIAGNOSIS — E78.5 DYSLIPIDEMIA: ICD-10-CM

## 2019-03-26 DIAGNOSIS — I10 ESSENTIAL HYPERTENSION: ICD-10-CM

## 2019-03-26 DIAGNOSIS — E04.1 THYROID NODULE: ICD-10-CM

## 2019-03-26 DIAGNOSIS — G44.81 HYPNIC HEADACHE: ICD-10-CM

## 2019-03-26 DIAGNOSIS — Z00.00 HEALTH CARE MAINTENANCE: ICD-10-CM

## 2019-03-26 DIAGNOSIS — Z23 NEED FOR TDAP VACCINATION: ICD-10-CM

## 2019-03-26 DIAGNOSIS — J06.9 UPPER RESPIRATORY TRACT INFECTION, UNSPECIFIED TYPE: ICD-10-CM

## 2019-03-26 PROCEDURE — 99214 OFFICE O/P EST MOD 30 MIN: CPT | Mod: 25 | Performed by: INTERNAL MEDICINE

## 2019-03-26 PROCEDURE — 90715 TDAP VACCINE 7 YRS/> IM: CPT | Performed by: INTERNAL MEDICINE

## 2019-03-26 PROCEDURE — 90471 IMMUNIZATION ADMIN: CPT | Performed by: INTERNAL MEDICINE

## 2019-03-26 RX ORDER — PROPRANOLOL HYDROCHLORIDE 40 MG/1
40 TABLET ORAL 2 TIMES DAILY
Qty: 60 TAB | Refills: 3 | Status: SHIPPED | OUTPATIENT
Start: 2019-03-26 | End: 2019-09-13 | Stop reason: SDUPTHER

## 2019-03-26 ASSESSMENT — PATIENT HEALTH QUESTIONNAIRE - PHQ9: CLINICAL INTERPRETATION OF PHQ2 SCORE: 0

## 2019-03-26 NOTE — NON-PROVIDER
"Maryann Ashley is a 58 y.o. female here for a non-provider visit for:   TDAP    Reason for immunization: Overdue/Provider Recommended  Immunization records indicate need for vaccine: Yes, confirmed with Epic  Minimum interval has been met for this vaccine: Yes  ABN completed: Not Indicated    Order and dose verified by: Annette  VIS Dated  8/7/15 was given to patient: Yes  All IAC Questionnaire questions were answered \"No.\"    Patient tolerated injection and no adverse effects were observed or reported: Yes    Pt scheduled for next dose in series: Not Indicated    "

## 2019-03-26 NOTE — PROGRESS NOTES
Maryann Ashley is a 58 y.o. female who is here for routine follow up.    CC: 6-month follow-up, hypertension, headaches, upper respiratory tract infection    HPI:    Patient is a 58-year-old female who is here for routine follow-up today.  Patient has a past medical history of hypertension, dyslipidemia, thyroid nodule, migraine headaches who is presenting to the clinic today also with complaints of upper respiratory tract infection.  She says she went to [place] a few days ago and was prescribed azithromycin as well as a cough medicine.  She has been taking that and feels a little better but just wanted to bring it up.  She also says she is been noticing headaches a little more because of the upper respiratory tract infection.  No chills, no abdominal pain, no nausea, no vomiting, and no shortness of breath.  Overall, she says she is feeling better.  She said the symptoms of sore throat and cough started Wednesday and she was coughing a lot that she had to throw up.  She did not throw up ever since that one episode.  She noticed her temperature was 100.2 °F, she does not complain of any nasal congestion.  Because of all that she had gone to the ER.  She says her right ear feels a little plugged up and was having pain and feels a little better now.  She said when she went to the ER, they checked her for strep and influenza which were both negative.    In regards to her migraines, she says they are more frequent so she was wondering if we can go up on the propranolol dose.  She takes propranolol 30 mg twice daily which works well for her but because of the illness and everything going on, she is wondering if that can be increased.  She also has a couple of scratches on her left upper back and her lower back has a little bit of a bruise which she was concerned about and wanted to know why it was there.  No discoloration, no changes.    She does have a history of thyroid nodules and her last ultrasound was in  October 2018 which showed stable appearance of 3 thyroid nodules and no new thyroid nodule or thyroid gland enlargement.  No extra thyroid mass either.  She does not endorse any symptoms at this time.  Her TSH has been normal.  Her thyroid nodules are hypo-echoic without microcalcifications, no extrathyroidal extension and they are round.  All the measurements of the thyroid nodules are less than 1 cm.  According to the guidelines consider biopsy if the size is more than 1 cm.    No problem-specific Assessment & Plan notes found for this encounter.      She  has a past medical history of Fuchs' corneal dystrophy (1/23/2018); Giant cell tumor of bone (2006); Hypertension; Hypnic headache; Migraine; and Thyroid nodule.    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    Current Outpatient Prescriptions   Medication Sig Dispense Refill   • propranolol (INDERAL) 40 MG Tab Take 1 Tab by mouth 2 times a day. 60 Tab 3   • indomethacin SR (INDOCIN SR) 75 MG Cap CR TAKE 1 CAPSULE BY MOUTH ONCE DAILY 30 Cap 3   • atorvastatin (LIPITOR) 10 MG Tab TAKE 1 TABLET BY MOUTH ONCE DAILY (Patient taking differently: Take 10 mg by mouth every day. TAKE 1 TABLET BY MOUTH ONCE DAILY) 90 Tab 3   • sumatriptan (IMITREX) 100 MG tablet Take 1 Tab by mouth Once PRN for Migraine. 10 Tab 3   • urea 40 % Cream urea 40 % topical cream. PRN     • trazodone (DESYREL) 50 MG Tab Take 1 Tab by mouth at bedtime  "as needed for Sleep. 30 Tab 1     No current facility-administered medications for this visit.        Social History   Substance Use Topics   • Smoking status: Never Smoker   • Smokeless tobacco: Never Used   • Alcohol use Yes      Comment: 1 per month       Family History   Problem Relation Age of Onset   • Breast Cancer Mother 30         at 63 yr   • Cancer Father         metastatic; Lung   • Heart Disease Father    • Hypertension Father    • Stroke Father    • Cancer Sister         breast   • Diabetes Sister    • Diabetes Maternal Grandmother      Review of Systems:   Pertinent positives as stated in HPI, all others reviewed as negative.    Physical Exam:  Blood pressure 125/82, pulse 66, temperature 37 °C (98.6 °F), temperature source Temporal, height 1.676 m (5' 6\"), weight 65.8 kg (145 lb), SpO2 96 %, not currently breastfeeding. Body mass index is 23.4 kg/m².    General Appearance: healthy, alert, no distress, cooperative  Skin: Scratch marks on left upper back, a small bruise in lower back. No concerns, no drainage, no discoloration, non tender.   Head: Normocephalic. No masses appreciated.   Eyes: PERRLA.  Ears: TM intact bilaterally, non-bulging, mild erythema on the right, cerumen present bilaterally but not obstructing view. No drainage, no tenderness upon examination.   Oropharynx: Oropharynx moist and without lesion.  Neck: Neck supple. No adenopathy.  Lungs: Lungs clear to auscultation bilaterally.  Heart: RRR without murmur, gallop, or rubs.  Abdomen: Soft, non-tender. BS normal.   Musculoskeletal: Extremities: Upper and lower extremities appear normal. No deformities, edema.   Psychiatric: Mood appears normal.     Assessment/Plan:     1. Health care maintenance  Patient has a gynecologist that she sees and prefers to get mammograms done through them.  She is due for routine labs.  Will order those.  She had a colonoscopy done May 2018.  She is to repeat a colonoscopy in 10 years which would be " 2028.  The colonoscopy report was normal.  She needs a tetanus injection so we will administer that today.  Did get influenza vaccine this year.  - CBC WITH DIFFERENTIAL; Future    2. Essential hypertension  The pressure is 125/82 today.  Patient does take propranolol for her migraines which she says helps with her blood pressure as well.  She says it was higher in the past but now has been well controlled.  Continue to monitor.  - Comp Metabolic Panel; Future    3. Dyslipidemia  Patient is on atorvastatin 10 mg daily.  Needs a repeat lipid panel.  Will order.  - Lipid Profile; Future    4. Thyroid nodule  Her TSH was normal in the past and she had an ultrasound done in October 2018 which showed stable appearance of the 3 thyroid nodules that she has which are all less than 1 cm.  No symptoms at this time.  - TSH WITH REFLEX TO FT4; Future    5. Hypnic headache  Will increase the dose of the propranolol from 30 mg twice daily to to 40 mg twice daily.  We will see if that helps her migraine episodes which have become more frequent recently.  She does take Imitrex but only if she absolutely needs it.  - propranolol (INDERAL) 40 MG Tab; Take 1 Tab by mouth 2 times a day.  Dispense: 60 Tab; Refill: 3    6. Upper respiratory tract infection, unspecified type  Patient did get azithromycin as well as cough medicine from the ER that she went to over the weekend.  She says she was checked for influenza and strep and they were both negative.  We will obtain records from [facility].  In the meantime, recommended the patient to complete the antibiotic course.  She does seem to be getting better though.    7. Need for Tdap vaccination  - Tdap =>6yo IM      Followup: Return in about 1 year (around 3/26/2020), or if symptoms worsen or fail to improve.    This note was created using voice recognition software. There may be unintended errors spelling, and grammar or content.

## 2019-03-26 NOTE — PATIENT INSTRUCTIONS
Cough, Adult  Introduction  A cough helps to clear your throat and lungs. A cough may last only 2-3 weeks (acute), or it may last longer than 8 weeks (chronic). Many different things can cause a cough. A cough may be a sign of an illness or another medical condition.  Follow these instructions at home:  · Pay attention to any changes in your cough.  · Take medicines only as told by your doctor.  ¨ If you were prescribed an antibiotic medicine, take it as told by your doctor. Do not stop taking it even if you start to feel better.  ¨ Talk with your doctor before you try using a cough medicine.  · Drink enough fluid to keep your pee (urine) clear or pale yellow.  · If the air is dry, use a cold steam vaporizer or humidifier in your home.  · Stay away from things that make you cough at work or at home.  · If your cough is worse at night, try using extra pillows to raise your head up higher while you sleep.  · Do not smoke, and try not to be around smoke. If you need help quitting, ask your doctor.  · Do not have caffeine.  · Do not drink alcohol.  · Rest as needed.  Contact a doctor if:  · You have new problems (symptoms).  · You cough up yellow fluid (pus).  · Your cough does not get better after 2-3 weeks, or your cough gets worse.  · Medicine does not help your cough and you are not sleeping well.  · You have pain that gets worse or pain that is not helped with medicine.  · You have a fever.  · You are losing weight and you do not know why.  · You have night sweats.  Get help right away if:  · You cough up blood.  · You have trouble breathing.  · Your heartbeat is very fast.  This information is not intended to replace advice given to you by your health care provider. Make sure you discuss any questions you have with your health care provider.  Document Released: 08/30/2012 Document Revised: 05/25/2017 Document Reviewed: 02/24/2016  © 2017 Elsevier      Upper Respiratory Infection, Adult  Most upper respiratory  "infections (URIs) are caused by a virus. A URI affects the nose, throat, and upper air passages. The most common type of URI is often called \"the common cold.\"  Follow these instructions at home:  · Take medicines only as told by your doctor.  · Gargle warm saltwater or take cough drops to comfort your throat as told by your doctor.  · Use a warm mist humidifier or inhale steam from a shower to increase air moisture. This may make it easier to breathe.  · Drink enough fluid to keep your pee (urine) clear or pale yellow.  · Eat soups and other clear broths.  · Have a healthy diet.  · Rest as needed.  · Go back to work when your fever is gone or your doctor says it is okay.  ¨ You may need to stay home longer to avoid giving your URI to others.  ¨ You can also wear a face mask and wash your hands often to prevent spread of the virus.  · Use your inhaler more if you have asthma.  · Do not use any tobacco products, including cigarettes, chewing tobacco, or electronic cigarettes. If you need help quitting, ask your doctor.  Contact a doctor if:  · You are getting worse, not better.  · Your symptoms are not helped by medicine.  · You have chills.  · You are getting more short of breath.  · You have brown or red mucus.  · You have yellow or brown discharge from your nose.  · You have pain in your face, especially when you bend forward.  · You have a fever.  · You have puffy (swollen) neck glands.  · You have pain while swallowing.  · You have white areas in the back of your throat.  Get help right away if:  · You have very bad or constant:  ¨ Headache.  ¨ Ear pain.  ¨ Pain in your forehead, behind your eyes, and over your cheekbones (sinus pain).  ¨ Chest pain.  · You have long-lasting (chronic) lung disease and any of the following:  ¨ Wheezing.  ¨ Long-lasting cough.  ¨ Coughing up blood.  ¨ A change in your usual mucus.  · You have a stiff neck.  · You have changes in " your:  ¨ Vision.  ¨ Hearing.  ¨ Thinking.  ¨ Mood.  This information is not intended to replace advice given to you by your health care provider. Make sure you discuss any questions you have with your health care provider.  Document Released: 06/05/2009 Document Revised: 08/20/2017 Document Reviewed: 03/25/2015  Elsevier Interactive Patient Education © 2017 Elsevier Inc.

## 2019-04-04 DIAGNOSIS — E04.1 THYROID NODULE: ICD-10-CM

## 2019-04-04 DIAGNOSIS — I10 ESSENTIAL HYPERTENSION: ICD-10-CM

## 2019-04-04 DIAGNOSIS — E78.5 DYSLIPIDEMIA: ICD-10-CM

## 2019-04-04 DIAGNOSIS — Z00.00 HEALTH CARE MAINTENANCE: ICD-10-CM

## 2019-04-05 ENCOUNTER — PATIENT MESSAGE (OUTPATIENT)
Dept: INTERNAL MEDICINE | Facility: MEDICAL CENTER | Age: 59
End: 2019-04-05

## 2019-04-05 NOTE — TELEPHONE ENCOUNTER
From: Maryann Ashley  To: Malcom Parsons M.D.  Sent: 4/5/2019 4:17 PM PDT  Subject: Test Result Question    Hi Dr. Parsons,    Did [facility] send you my lab results ? I saw them on Monday on the [facility] mark, but there were a few things about my cholesterol in red and my glucose, which I know to avoid carbs and sugar. Hard to do :)    Thank you!    Maryann

## 2019-06-27 DIAGNOSIS — G44.81 HYPNIC HEADACHE: ICD-10-CM

## 2019-06-27 RX ORDER — INDOMETHACIN 75 MG/1
CAPSULE, EXTENDED RELEASE ORAL
Qty: 30 CAP | Refills: 3 | Status: SHIPPED | OUTPATIENT
Start: 2019-06-27 | End: 2019-09-13 | Stop reason: SDUPTHER

## 2019-06-27 NOTE — TELEPHONE ENCOUNTER
Was the patient seen in the last year in this department? Yes   Last seen: 03/06/19 by Dr. Jaqueline Rivers appt: NONE      Does patient have an active prescription for medications requested? No     Received Request Via: Pharmacy

## 2019-07-02 ENCOUNTER — PATIENT MESSAGE (OUTPATIENT)
Dept: INTERNAL MEDICINE | Facility: MEDICAL CENTER | Age: 59
End: 2019-07-02

## 2019-07-02 DIAGNOSIS — R10.2 VAGINAL PAIN: ICD-10-CM

## 2019-07-05 ENCOUNTER — HOSPITAL ENCOUNTER (OUTPATIENT)
Dept: HOSPITAL 8 - CFH | Age: 59
Discharge: HOME | End: 2019-07-05
Attending: OBSTETRICS & GYNECOLOGY
Payer: COMMERCIAL

## 2019-07-05 DIAGNOSIS — Z78.0: ICD-10-CM

## 2019-07-05 DIAGNOSIS — Z12.31: Primary | ICD-10-CM

## 2019-07-05 DIAGNOSIS — M81.0: ICD-10-CM

## 2019-07-05 DIAGNOSIS — Z80.3: ICD-10-CM

## 2019-07-05 PROCEDURE — 77067 SCR MAMMO BI INCL CAD: CPT

## 2019-07-05 PROCEDURE — 77080 DXA BONE DENSITY AXIAL: CPT

## 2019-07-05 PROCEDURE — 77063 BREAST TOMOSYNTHESIS BI: CPT

## 2019-07-08 ENCOUNTER — PATIENT MESSAGE (OUTPATIENT)
Dept: INTERNAL MEDICINE | Facility: MEDICAL CENTER | Age: 59
End: 2019-07-08

## 2019-07-09 NOTE — TELEPHONE ENCOUNTER
From: Maryann Ashley  To: Malcom Parsons M.D.  Sent: 7/8/2019 7:35 PM PDT  Subject: Non-Urgent Medical Question    as it turns out, my ob/gyn office of Dr Marquis contacted me today regarding my bone density scan results and apparently I have severe osteoporosis, so while I had them on the phone I made an appointment to discuss my pain issues and they want to do more lab work also regarding my brittle bone issues. I will have her examine me while there and see if she feels ultrasound is needed. I will let you know. I appreciate you contacting me and hope you are having a nice vacation!    Maryann    ----- Message -----  From: Malcom Parsons M.D.  Sent: 7/8/19, 7:25 PM  To: Maryann Ashley  Subject: RE: Non-Urgent Medical Question    Maryann,    We changed medical record systems so I will not be able to communicate with you through [facility]. I am on vacation but checking through [employer] for any messages since I still have access to it. I am sorry about being delayed. I will order an ultrasound for you to get done and see if there is anything concerning. We can mail the order to you. Let me know if you do not get the order for the Ultrasound. You can call [phone] to check on it and schedule it as well.       ----- Message -----   From: Maryann Ashley   Sent: 7/2/2019 3:55 PM PDT   To: Malcom Parsons M.D.  Subject: Non-Urgent Medical Question    Hi Dr. Parsons,    I don't think I have discussed with you the pain I have during sex. I have brought it up to my ob/gyn, Dr. Preethi Marquis. She gave me some estrace 0.1/gm to use. This has been going on for almost 2 years. Anyway, I had a friend visit me from [place] recently and she told me she was having a polyp removed from her vagina soon. I asked her how she found out about it and she told me she had a lot of pain during sex and I think she said they did an ultrasound. Do you think we can do something like that to rule the polyp thing out. I would ask Dr. Marquis but I don't have the  communication with her that I do with you. Do I need to make an appt with you first?    Thank you!    Maryann Ashley  [birth year]

## 2019-07-19 ENCOUNTER — APPOINTMENT (OUTPATIENT)
Dept: MEDICAL GROUP | Facility: IMAGING CENTER | Age: 59
End: 2019-07-19

## 2019-07-26 ENCOUNTER — APPOINTMENT (OUTPATIENT)
Dept: MEDICAL GROUP | Facility: IMAGING CENTER | Age: 59
End: 2019-07-26

## 2019-08-19 ENCOUNTER — TELEPHONE (OUTPATIENT)
Dept: SCHEDULING | Facility: IMAGING CENTER | Age: 59
End: 2019-08-19

## 2019-08-20 ENCOUNTER — OFFICE VISIT (OUTPATIENT)
Dept: MEDICAL GROUP | Facility: MEDICAL CENTER | Age: 59
End: 2019-08-20
Payer: COMMERCIAL

## 2019-08-20 VITALS
TEMPERATURE: 98.1 F | BODY MASS INDEX: 23.46 KG/M2 | DIASTOLIC BLOOD PRESSURE: 62 MMHG | HEART RATE: 60 BPM | RESPIRATION RATE: 16 BRPM | WEIGHT: 146 LBS | HEIGHT: 66 IN | SYSTOLIC BLOOD PRESSURE: 110 MMHG | OXYGEN SATURATION: 99 %

## 2019-08-20 DIAGNOSIS — G47.9 SLEEP DISORDER: ICD-10-CM

## 2019-08-20 DIAGNOSIS — M81.0 OSTEOPOROSIS, UNSPECIFIED OSTEOPOROSIS TYPE, UNSPECIFIED PATHOLOGICAL FRACTURE PRESENCE: ICD-10-CM

## 2019-08-20 DIAGNOSIS — E78.5 DYSLIPIDEMIA: Chronic | ICD-10-CM

## 2019-08-20 DIAGNOSIS — G89.29 CHRONIC LEFT-SIDED LOW BACK PAIN WITHOUT SCIATICA: ICD-10-CM

## 2019-08-20 DIAGNOSIS — Z79.890 HORMONE REPLACEMENT THERAPY (HRT): ICD-10-CM

## 2019-08-20 DIAGNOSIS — Z12.83 SCREENING FOR MALIGNANT NEOPLASM OF SKIN: ICD-10-CM

## 2019-08-20 DIAGNOSIS — M54.50 CHRONIC LEFT-SIDED LOW BACK PAIN WITHOUT SCIATICA: ICD-10-CM

## 2019-08-20 DIAGNOSIS — G44.81 HYPNIC HEADACHE: Chronic | ICD-10-CM

## 2019-08-20 DIAGNOSIS — M25.552 LEFT HIP PAIN: ICD-10-CM

## 2019-08-20 DIAGNOSIS — I10 ESSENTIAL HYPERTENSION: Chronic | ICD-10-CM

## 2019-08-20 PROBLEM — Z00.00 HEALTH CARE MAINTENANCE: Status: RESOLVED | Noted: 2017-10-12 | Resolved: 2019-08-20

## 2019-08-20 PROCEDURE — 99214 OFFICE O/P EST MOD 30 MIN: CPT | Performed by: NURSE PRACTITIONER

## 2019-08-20 RX ORDER — ESTRADIOL 10 UG/1
10 INSERT VAGINAL DAILY
COMMUNITY
End: 2019-09-13 | Stop reason: SDUPTHER

## 2019-08-20 RX ORDER — ALENDRONATE SODIUM 70 MG/1
TABLET ORAL
COMMUNITY
Start: 2019-07-31 | End: 2019-09-13 | Stop reason: SDUPTHER

## 2019-08-20 RX ORDER — ZOSTER VACCINE RECOMBINANT, ADJUVANTED 50 MCG/0.5
KIT INTRAMUSCULAR
COMMUNITY
Start: 2019-07-31 | End: 2020-10-20

## 2019-08-20 RX ORDER — CYCLOBENZAPRINE HCL 10 MG
10 TABLET ORAL 3 TIMES DAILY PRN
COMMUNITY
End: 2019-09-13 | Stop reason: SDUPTHER

## 2019-08-20 SDOH — HEALTH STABILITY: MENTAL HEALTH: HOW OFTEN DO YOU HAVE A DRINK CONTAINING ALCOHOL?: MONTHLY OR LESS

## 2019-08-20 NOTE — PROGRESS NOTES
cc:  Need for referrals       Subjective:     HPI:     Maryann Ashley is a 58 y.o. female here to discuss the evaluation and management of:    Here for refills today.  She will be establishing with me in September.    1. Chronic left-sided low back pain without sciatica  Patient states that she does have low back pain without any sciatica.  States that she seen chiropractor in the past and is requesting a referral.  Denies any overt loss of bowel or bladder saddle anesthesia.  No trauma or injury.  No imaging on file.    2. Left hip pain  Patient states she also has left hip pain that accompanies her left lower back pain.  States that again she would like to be seen by chiropractor and she needs referral for this.  Patient states that she has had acupuncture in the past and this is been helpful.    3. Screening for malignant neoplasm of skin  No concerns at this time.  Requesting a referral to dermatology to have a full skin check.    4. Essential hypertension  Patient states that she was taking propranolol 40 mg twice a day for this.    5. Dyslipidemia  Patient states she is taking atorvastatin 10 mg for this.  Denies myalgias.    6. Hypnic headache  Patient states that she is essentially self diagnosed herself with a hypnic headache.  States that happens in the middle the night and it wakes her up.  Patient states that it started after the age of 48.  States that she gets them only now every couple of months.  It would wake her up in the middle of the night.  It presents on the left side of her temporal area.  States that she has a couple coffee and and indomethacin in the afternoon she will not get them.      7.  Hormone replacement therapy  Taking Yuvafem twice a week for this.      8.  Sleep disorder  Takes trazodone as needed for this.    9. Osteoporosis  States she was told she has osteoporosis and has been on Fosamax for the last 2 months.  Denies any side effects.        ROS:  Denies any Headache, Blurred  Vision, Confusion, Chest pain,  Shortness of breath,  Abdominal pain, Changes of bowel or bladder, Lower ext edema, Fevers, Nights sweats, Weight Changes, Focal weakness or numbness.  And all other systems are negative.lower back pain/left hip pain        Current Outpatient Medications:   •  estradiol (YUVAFEM) 10 MCG Tab, Insert 10 mcg in vagina every day., Disp: , Rfl:   •  cyclobenzaprine (FLEXERIL) 10 MG Tab, Take 10 mg by mouth 3 times a day as needed., Disp: , Rfl:   •  alendronate (FOSAMAX) 70 MG Tab, , Disp: , Rfl:   •  indomethacin SR (INDOCIN SR) 75 MG Cap CR, TAKE 1 CAPSULE BY MOUTH ONCE DAILY, Disp: 30 Cap, Rfl: 3  •  propranolol (INDERAL) 40 MG Tab, Take 1 Tab by mouth 2 times a day., Disp: 60 Tab, Rfl: 3  •  atorvastatin (LIPITOR) 10 MG Tab, TAKE 1 TABLET BY MOUTH ONCE DAILY (Patient taking differently: Take 10 mg by mouth every day. TAKE 1 TABLET BY MOUTH ONCE DAILY), Disp: 90 Tab, Rfl: 3  •  sumatriptan (IMITREX) 100 MG tablet, Take 1 Tab by mouth Once PRN for Migraine., Disp: 10 Tab, Rfl: 3  •  trazodone (DESYREL) 50 MG Tab, Take 1 Tab by mouth at bedtime as needed for Sleep., Disp: 30 Tab, Rfl: 1  •  Alendronate Sodium (FOSAMAX PO), Take  by mouth., Disp: , Rfl:   •  SHINGRIX 50 MCG/0.5ML Recon Susp, , Disp: , Rfl:   •  urea 40 % Cream, urea 40 % topical cream. PRN, Disp: , Rfl:     No Known Allergies    Past Medical History:   Diagnosis Date   • Fuchs' corneal dystrophy 1/23/2018   • Giant cell tumor of bone 2006    removal from L distal femur   • Hypertension    • Hypnic headache    • Migraine    • Thyroid nodule      Past Surgical History:   Procedure Laterality Date   • OTHER  2007    giant cell tumor removed rom Left distal femur   • FUSION  2004    cervical fusion C4-6   • ABDOMINAL HYSTERECTOMY TOTAL  1985    partial , for fibroids   • OTHER ORTHOPEDIC SURGERY      right wrist/base of thumb surgery for arthritis     Family History   Problem Relation Age of Onset   • Breast Cancer Mother 30          at 63 yr   • Cancer Father         metastatic; Lung   • Heart Disease Father    • Hypertension Father    • Stroke Father    • Cancer Sister         breast   • Diabetes Sister    • Diabetes Maternal Grandmother      Social History     Socioeconomic History   • Marital status:      Spouse name: Not on file   • Number of children: 2   • Years of education: Ass college   • Highest education level: Not on file   Occupational History     Comment: Retired Computer work   Social Needs   • Financial resource strain: Not on file   • Food insecurity:     Worry: Not on file     Inability: Not on file   • Transportation needs:     Medical: Not on file     Non-medical: Not on file   Tobacco Use   • Smoking status: Never Smoker   • Smokeless tobacco: Never Used   Substance and Sexual Activity   • Alcohol use: Yes     Frequency: Monthly or less     Comment: 1 per month   • Drug use: No   • Sexual activity: Yes     Partners: Male     Comment: , homemaker   Lifestyle   • Physical activity:     Days per week: Not on file     Minutes per session: Not on file   • Stress: Not on file   Relationships   • Social connections:     Talks on phone: Not on file     Gets together: Not on file     Attends Scientologist service: Not on file     Active member of club or organization: Not on file     Attends meetings of clubs or organizations: Not on file     Relationship status: Not on file   • Intimate partner violence:     Fear of current or ex partner: Not on file     Emotionally abused: Not on file     Physically abused: Not on file     Forced sexual activity: Not on file   Other Topics Concern   •  Service Not Asked   • Blood Transfusions Not Asked   • Caffeine Concern Not Asked   • Occupational Exposure Not Asked   • Hobby Hazards Not Asked   • Sleep Concern Not Asked   • Stress Concern Not Asked   • Weight Concern Not Asked   • Special Diet Not Asked   • Back Care Not Asked   • Exercise Yes   • Bike Helmet Not  "Asked   • Seat Belt Yes   • Self-Exams Not Asked   Social History Narrative    Lives with        Objective:     Vitals: /62   Pulse 60   Temp 36.7 °C (98.1 °F)   Resp 16   Ht 1.676 m (5' 6\")   Wt 66.2 kg (146 lb)   SpO2 99%   BMI 23.57 kg/m²    General: Alert, pleasant, NAD  HEENT: Normocephalic.    Skin: Warm, dry, no rashes.  Extremities: No leg edema. No discoloration  Neurological: No tremors  Psych:  Affect/mood is normal, judgement is good, memory is intact, grooming is appropriate.    Assessment/Plan:     Maryann was seen today for referral needed.    Diagnoses and all orders for this visit:    Chronic left-sided low back pain without sciatica  -     REFERRAL TO CHIROPRACTIC    Left hip pain  -     REFERRAL TO CHIROPRACTIC    Screening for malignant neoplasm of skin  -     REFERRAL TO DERMATOLOGY    Essential hypertension  Stable.  Tolerating propranolol for this.    Dyslipidemia  Stable.  Tolerating atorvastatin for this.  Denies any myalgias.    Hypnic headache  Chronic.  Patient states that this is well controlled with her current regimen/self treatment.  Has never seen neurology for this.    Hormone replacement therapy  On Yuvafem twice a week for this.  Previously was on Estrace.    Sleep disorder  Takes trazodone as needed for this    Osteoporosis unspecified osteoporosis type, unspecified pathological fracture presence  Recently started on Fosamax 2 months ago.  Tolerating without any side effects.  Need records of DEXA scan.      Return if symptoms worsen or fail to improve.          Jeanine SOSA  "

## 2019-09-13 ENCOUNTER — OFFICE VISIT (OUTPATIENT)
Dept: MEDICAL GROUP | Facility: MEDICAL CENTER | Age: 59
End: 2019-09-13
Payer: COMMERCIAL

## 2019-09-13 VITALS
DIASTOLIC BLOOD PRESSURE: 80 MMHG | OXYGEN SATURATION: 97 % | TEMPERATURE: 97.7 F | HEIGHT: 66 IN | HEART RATE: 60 BPM | BODY MASS INDEX: 23.3 KG/M2 | RESPIRATION RATE: 16 BRPM | SYSTOLIC BLOOD PRESSURE: 120 MMHG | WEIGHT: 145 LBS

## 2019-09-13 DIAGNOSIS — G47.9 SLEEP DISORDER: ICD-10-CM

## 2019-09-13 DIAGNOSIS — G44.81 HYPNIC HEADACHE: ICD-10-CM

## 2019-09-13 DIAGNOSIS — N94.10 DYSPAREUNIA, FEMALE: ICD-10-CM

## 2019-09-13 DIAGNOSIS — Z23 NEED FOR VACCINATION: ICD-10-CM

## 2019-09-13 DIAGNOSIS — E78.5 DYSLIPIDEMIA: Chronic | ICD-10-CM

## 2019-09-13 DIAGNOSIS — Z76.89 ENCOUNTER TO ESTABLISH CARE: ICD-10-CM

## 2019-09-13 DIAGNOSIS — Z79.890 HORMONE REPLACEMENT THERAPY (HRT): Chronic | ICD-10-CM

## 2019-09-13 DIAGNOSIS — I10 ESSENTIAL HYPERTENSION: Chronic | ICD-10-CM

## 2019-09-13 DIAGNOSIS — M54.2 NECK PAIN, BILATERAL POSTERIOR: ICD-10-CM

## 2019-09-13 PROCEDURE — 99214 OFFICE O/P EST MOD 30 MIN: CPT | Mod: 25 | Performed by: NURSE PRACTITIONER

## 2019-09-13 PROCEDURE — 90471 IMMUNIZATION ADMIN: CPT | Performed by: NURSE PRACTITIONER

## 2019-09-13 PROCEDURE — 90686 IIV4 VACC NO PRSV 0.5 ML IM: CPT | Performed by: NURSE PRACTITIONER

## 2019-09-13 RX ORDER — ESTRADIOL 10 UG/1
10 INSERT VAGINAL
Qty: 30 TAB | Refills: 6 | Status: SHIPPED | OUTPATIENT
Start: 2019-09-13 | End: 2020-07-06

## 2019-09-13 RX ORDER — ALENDRONATE SODIUM 70 MG/1
70 TABLET ORAL
Qty: 4 TAB | Refills: 6 | Status: SHIPPED | OUTPATIENT
Start: 2019-09-13 | End: 2020-07-27

## 2019-09-13 RX ORDER — TRAZODONE HYDROCHLORIDE 50 MG/1
50 TABLET ORAL NIGHTLY PRN
Qty: 30 TAB | Refills: 6 | Status: SHIPPED | OUTPATIENT
Start: 2019-09-13 | End: 2020-10-20

## 2019-09-13 RX ORDER — CYCLOBENZAPRINE HCL 10 MG
10 TABLET ORAL 3 TIMES DAILY PRN
Qty: 30 TAB | Refills: 3 | Status: SHIPPED | OUTPATIENT
Start: 2019-09-13 | End: 2020-12-21

## 2019-09-13 RX ORDER — SUMATRIPTAN 100 MG/1
100 TABLET, FILM COATED ORAL
Qty: 10 TAB | Refills: 3 | Status: SHIPPED | OUTPATIENT
Start: 2019-09-13 | End: 2020-06-24

## 2019-09-13 RX ORDER — INDOMETHACIN 75 MG/1
75 CAPSULE, EXTENDED RELEASE ORAL
Qty: 90 CAP | Refills: 3 | Status: SHIPPED | OUTPATIENT
Start: 2019-09-13 | End: 2020-09-24

## 2019-09-13 RX ORDER — PROPRANOLOL HYDROCHLORIDE 40 MG/1
40 TABLET ORAL 2 TIMES DAILY
Qty: 60 TAB | Refills: 6 | Status: SHIPPED | OUTPATIENT
Start: 2019-09-13 | End: 2020-07-27

## 2019-09-13 RX ORDER — ATORVASTATIN CALCIUM 10 MG/1
10 TABLET, FILM COATED ORAL
Qty: 90 TAB | Refills: 3 | Status: SHIPPED | OUTPATIENT
Start: 2019-09-13 | End: 2020-09-24

## 2019-09-13 NOTE — LETTER
[facility]  NANETTE Ravi.P.R.N.  [address]  [place] [address]  Fax: [phone]   Authorization for Release/Disclosure of   Protected Health Information   Name: MARYANN ASHLEY : 1960 SSN: [id number]   Address: [address] [place] [address]  [place] [address] Phone:    [phone] (home)    I authorize the entity listed below to release/disclose the PHI below to:   [facility]/Jeanine Dorado A.P.R.N. and NANETTE Ravi.P.R.N.   Provider or Entity Name:  [facility]   Address   City, State, Zip   Phone:      Fax:     Reason for request: continuity of care   Information to be released:    [X  ] LAST COLONOSCOPY,  including any PATH REPORT and follow-up  [  ] LAST FIT/COLOGUARD RESULT [  ] LAST DEXA  [  ] LAST MAMMOGRAM  [  ] LAST PAP  [  ] LAST LABS [  ] RETINA EXAM REPORT  [  ] IMMUNIZATION RECORDS  [  ] Release all info      [  ] Check here and initial the line next to each item to release ALL health information INCLUDING  _____ Care and treatment for drug and / or alcohol abuse  _____ HIV testing, infection status, or AIDS  _____ Genetic Testing    DATES OF SERVICE OR TIME PERIOD TO BE DISCLOSED: _____________  I understand and acknowledge that:  * This Authorization may be revoked at any time by you in writing, except if your health information has already been used or disclosed.  * Your health information that will be used or disclosed as a result of you signing this authorization could be re-disclosed by the recipient. If this occurs, your re-disclosed health information may no longer be protected by State or Federal laws.  * You may refuse to sign this Authorization. Your refusal will not affect your ability to obtain treatment.  * This Authorization becomes effective upon signing and will  on (date) __________.      If no date is indicated, this Authorization will  one (1) year from the signature date.    Name: Maryann Ashley    Signature:   Date:      9/13/2019       PLEASE FAX REQUESTED RECORDS BACK TO: [phone]

## 2019-09-13 NOTE — LETTER
[facility]  NANETTE Ravi.P.R.N.  [address]  [place] [address]  Fax: [phone]   Authorization for Release/Disclosure of   Protected Health Information   Name: MARYANN SOUTH : 1960 SSN: [id number]   Address: [address] [place] [address] [place] [address] Phone:    [phone] (home)    I authorize the entity listed below to release/disclose the PHI below to:   [facility]/Jeanine Dorado A.P.R.N. and NANETTE Ravi.P.R.N.   Provider or Entity Name:  [facility], [facility], [facility]   Phone:      Fax:     Reason for request: continuity of care   Information to be released:    [  ] LAST COLONOSCOPY,  including any PATH REPORT and follow-up  [  ] LAST FIT/COLOGUARD RESULT [x  ] LAST DEXA  [  ] LAST MAMMOGRAM  [  ] LAST PAP  [  ] LAST LABS [  ] RETINA EXAM REPORT  [  ] IMMUNIZATION RECORDS  [  ] Release all info      [  ] Check here and initial the line next to each item to release ALL health information INCLUDING  _____ Care and treatment for drug and / or alcohol abuse  _____ HIV testing, infection status, or AIDS  _____ Genetic Testing    DATES OF SERVICE OR TIME PERIOD TO BE DISCLOSED: _____________  I understand and acknowledge that:  * This Authorization may be revoked at any time by you in writing, except if your health information has already been used or disclosed.  * Your health information that will be used or disclosed as a result of you signing this authorization could be re-disclosed by the recipient. If this occurs, your re-disclosed health information may no longer be protected by State or Federal laws.  * You may refuse to sign this Authorization. Your refusal will not affect your ability to obtain treatment.  * This Authorization becomes effective upon signing and will  on (date) __________.      If no date is indicated, this Authorization will  one (1) year from the signature date.    Name: Maryann South    Signature:   Date:        9/13/2019       PLEASE FAX REQUESTED RECORDS BACK TO: [phone]

## 2019-09-13 NOTE — PROGRESS NOTES
"CC: Establish care/dyspareunia      Maryann Ashley is a 58 y.o. female here to establish care and to discuss the evaluation and management of:    Patient here today to establish care.     Former PCP: [facility]      Patient presents today to discuss : Multiple concerns    1. Hypnic headache  Patient takes sumatriptan and and indomethacin for this.  Patient states that this presented after the age of 48.  States it wakes her up in the middle the night.  It typically presents in the left side of her temporal area.  States that in order to help prevent this she is a couple coffee and and indomethacin in the afternoon and this will help her avoid getting these headaches.    2. Sleep disorder  Taking trazodone for this.  States it is helpful for her as needed.    3. Dyspareunia, female  Taking Yuvafem a few days a week for this.    4. Hormone replacement therapy (HRT)  Currently taking the Yuvafem a few days a week for this.    5. Essential hypertension  Patient takes propranolol twice daily for this.    6. Dyslipidemia  Taking atorvastatin for this.  Denies myalgias.    7. Neck pain, bilateral posterior  Patient states the last 3 to 6 months she has noticed some tightness in her neck and shoulders.  States that last about 5 to 10 minutes.  States first time this happened she did feel a bit dizzy but has not happened since.  Denies any heart palpitations, heart racing or nausea.  She is somewhat concerned that it is related to a\" heart problem.\"  Denies any jaw pain, nausea, arm pain or any aggravating factors.    ROS:  Denies any Headache, Blurred Vision, Confusion, Chest pain,  Shortness of breath,  Abdominal pain, Changes of bowel or bladder, Hematuria, Hematochezia, Lower ext. edema, Fevers, Nights sweats, Weight Changes, Focal weakness or numbness.  And all other systems are negative.  Dyspareunia, intermittent neck pain      Current Outpatient Medications:   •  propranolol (INDERAL) 40 MG Tab, Take 1 Tab by mouth 2 " times a day., Disp: 60 Tab, Rfl: 6  •  traZODone (DESYREL) 50 MG Tab, Take 1 Tab by mouth at bedtime as needed for Sleep., Disp: 30 Tab, Rfl: 6  •  sumatriptan (IMITREX) 100 MG tablet, Take 1 Tab by mouth Once PRN for Migraine., Disp: 10 Tab, Rfl: 3  •  cyclobenzaprine (FLEXERIL) 10 MG Tab, Take 1 Tab by mouth 3 times a day as needed., Disp: 30 Tab, Rfl: 3  •  estradiol (YUVAFEM) 10 MCG Tab, Insert 1 Tab in vagina every 72 hours., Disp: 30 Tab, Rfl: 6  •  alendronate (FOSAMAX) 70 MG Tab, Take 1 Tab by mouth every 7 days., Disp: 4 Tab, Rfl: 6  •  atorvastatin (LIPITOR) 10 MG Tab, Take 1 Tab by mouth every bedtime., Disp: 90 Tab, Rfl: 3  •  indomethacin SR (INDOCIN SR) 75 MG Cap CR, Take 1 Cap by mouth every day., Disp: 90 Cap, Rfl: 3  •  Alendronate Sodium (FOSAMAX PO), Take  by mouth., Disp: , Rfl:   •  SHINGRIX 50 MCG/0.5ML Recon Susp, , Disp: , Rfl:     No Known Allergies    Past Medical History:   Diagnosis Date   • Fuchs' corneal dystrophy 2018   • Giant cell tumor of bone 2006    removal from L distal femur   • Hypertension    • Hypnic headache    • Migraine    • Thyroid nodule      Past Surgical History:   Procedure Laterality Date   • OTHER      giant cell tumor removed rom Left distal femur   • FUSION      cervical fusion C4-6   • ABDOMINAL HYSTERECTOMY TOTAL  1985    partial , for fibroids   • OTHER ORTHOPEDIC SURGERY      right wrist/base of thumb surgery for arthritis     Family History   Problem Relation Age of Onset   • Breast Cancer Mother 30         at 63 yr   • Cancer Father         metastatic; Lung   • Heart Disease Father    • Hypertension Father    • Stroke Father    • Cancer Sister         breast   • Diabetes Sister    • Diabetes Maternal Grandmother      Social History     Socioeconomic History   • Marital status:      Spouse name: Not on file   • Number of children: 2   • Years of education: Ass college   • Highest education level: Not on file   Occupational History      "Comment: Retired Computer work   Social Needs   • Financial resource strain: Not on file   • Food insecurity:     Worry: Not on file     Inability: Not on file   • Transportation needs:     Medical: Not on file     Non-medical: Not on file   Tobacco Use   • Smoking status: Never Smoker   • Smokeless tobacco: Never Used   Substance and Sexual Activity   • Alcohol use: Yes     Frequency: Monthly or less     Comment: 1 per month   • Drug use: No   • Sexual activity: Yes     Partners: Male     Comment: , homemaker   Lifestyle   • Physical activity:     Days per week: Not on file     Minutes per session: Not on file   • Stress: Not on file   Relationships   • Social connections:     Talks on phone: Not on file     Gets together: Not on file     Attends Congregational service: Not on file     Active member of club or organization: Not on file     Attends meetings of clubs or organizations: Not on file     Relationship status: Not on file   • Intimate partner violence:     Fear of current or ex partner: Not on file     Emotionally abused: Not on file     Physically abused: Not on file     Forced sexual activity: Not on file   Other Topics Concern   •  Service Not Asked   • Blood Transfusions Not Asked   • Caffeine Concern Not Asked   • Occupational Exposure Not Asked   • Hobby Hazards Not Asked   • Sleep Concern Not Asked   • Stress Concern Not Asked   • Weight Concern Not Asked   • Special Diet Not Asked   • Back Care Not Asked   • Exercise Yes   • Bike Helmet Not Asked   • Seat Belt Yes   • Self-Exams Not Asked   Social History Narrative    Lives with        Objective:     Vitals: /80   Pulse 60   Temp 36.5 °C (97.7 °F)   Resp 16   Ht 1.676 m (5' 6\")   Wt 65.8 kg (145 lb)   SpO2 97%   BMI 23.40 kg/m²      General: Alert, pleasant, NAD  HEENT:  Normocephalic.   Neck supple.  No thyromegaly or masses palpated. No cervical or supraclavicular lymphadenopathy.  Heart:  Regular rate and rhythm.  " S1 and S2 normal.  No murmurs appreciated.    Respiratory:  Normal respiratory effort.  Clear to auscultation bilaterally.    Skin:  Warm, dry, no rashes  Musculoskeletal:  Gait is normal.  Moves all extremities well.  Extremities:  No leg edema.  Neurological: No tremors  Psych:  Affect/mood is normal, judgement is good, memory is intact, grooming is appropriate.      Assessment and Plan.     58 y.o. female to establish care and discuss the followin. Hypnic headache  Chronic.  Well-controlled with her current regimen/self treatment.  Has not followed up with neurology for this.    - sumatriptan (IMITREX) 100 MG tablet; Take 1 Tab by mouth Once PRN for Migraine.  Dispense: 10 Tab; Refill: 3  - indomethacin SR (INDOCIN SR) 75 MG Cap CR; Take 1 Cap by mouth every day.  Dispense: 90 Cap; Refill: 3    2. Sleep disorder  Chronic.  Stable with as needed trazodone.  - traZODone (DESYREL) 50 MG Tab; Take 1 Tab by mouth at bedtime as needed for Sleep.  Dispense: 30 Tab; Refill: 6    3. Dyspareunia, female  Have discussed using over-the-counter products to help increase vaginal moisture, such as Replens.    4. Hormone replacement therapy (HRT)  She was prescribed this from her GYN.  Taking twice a week.  States it is somewhat helpful for the vaginal dryness and dyspareunia.  - estradiol (YUVAFEM) 10 MCG Tab; Insert 1 Tab in vagina every 72 hours.  Dispense: 30 Tab; Refill: 6    5. Essential hypertension  Stable.  Continue propranolol twice daily.  Due for labs.  - propranolol (INDERAL) 40 MG Tab; Take 1 Tab by mouth 2 times a day.  Dispense: 60 Tab; Refill: 6    6. Dyslipidemia  Stable.  Tolerating without any myalgias.  Due for labs.  - atorvastatin (LIPITOR) 10 MG Tab; Take 1 Tab by mouth every bedtime.  Dispense: 90 Tab; Refill: 3    7. Neck pain, bilateral posterior  Stable at this time.  There is no pain at this time.  Have discussed with patient to monitor for any associated symptoms.  Follow-up as needed.   Recommend stretching.    8. Need for vaccination  - Influenza Vaccine Quad Injection (PF)    9. Encounter to establish care    Other orders  - cyclobenzaprine (FLEXERIL) 10 MG Tab; Take 1 Tab by mouth 3 times a day as needed.  Dispense: 30 Tab; Refill: 3  - alendronate (FOSAMAX) 70 MG Tab; Take 1 Tab by mouth every 7 days.  Dispense: 4 Tab; Refill: 6          Return if symptoms worsen or fail to improve.          Jeanine ABRAHAM.

## 2019-09-16 PROBLEM — Z79.890 HORMONE REPLACEMENT THERAPY (HRT): Chronic | Status: ACTIVE | Noted: 2019-08-20

## 2019-10-31 ENCOUNTER — OFFICE VISIT (OUTPATIENT)
Dept: MEDICAL GROUP | Facility: MEDICAL CENTER | Age: 59
End: 2019-10-31
Payer: COMMERCIAL

## 2019-10-31 VITALS
TEMPERATURE: 97.7 F | RESPIRATION RATE: 16 BRPM | BODY MASS INDEX: 23.3 KG/M2 | HEART RATE: 70 BPM | DIASTOLIC BLOOD PRESSURE: 70 MMHG | WEIGHT: 145 LBS | HEIGHT: 66 IN | SYSTOLIC BLOOD PRESSURE: 100 MMHG | OXYGEN SATURATION: 97 %

## 2019-10-31 DIAGNOSIS — G43.009 MIGRAINE WITHOUT AURA AND WITHOUT STATUS MIGRAINOSUS, NOT INTRACTABLE: ICD-10-CM

## 2019-10-31 DIAGNOSIS — G44.81 HYPNIC HEADACHE: ICD-10-CM

## 2019-10-31 PROCEDURE — 99213 OFFICE O/P EST LOW 20 MIN: CPT | Performed by: NURSE PRACTITIONER

## 2019-10-31 NOTE — PROGRESS NOTES
cc: Migraine and hypnic headache      Subjective:     HPI:     Maryann Ashley is a 58 y.o. female here to discuss the evaluation and management of:    Patient presents today with complaints of having increased frequency of her migraines.  Patient states that over the last week or 2 she has noticed that she is had several migraines.  Denies any specific changes to her diet, medication, denies any recent travel or sickness.  States she does have some allergies that have been flaring up.  States that Imitrex is effective for her migraines.  States that she starts to present with unilateral pressure behind her forehead on the left side and then she will take a half of Imitrex.  Denies any aura.    Patient states that over one year ago she had increased her propranolol to 40 mg twice daily.  She has noticed when she takes her blood pressure at the grocery store that her blood pressure is slightly on the lower end.  She is asymptomatic.  She is wondering if this should be adjusted.    She also makes note that she has had an increase in her hypnic headaches over the last 1 week.  Last time she followed up with neurology was over 5 years ago.    ROS:  Denies any Headache, Blurred Vision, Confusion, Chest pain,  Shortness of breath,  Abdominal pain, Changes of bowel or bladder, Lower ext edema, Fevers, Nights sweats, Weight Changes, Focal weakness or numbness.  And all other systems are negative.  Migraine, hypnic headache      Current Outpatient Medications:   •  propranolol (INDERAL) 40 MG Tab, Take 1 Tab by mouth 2 times a day., Disp: 60 Tab, Rfl: 6  •  traZODone (DESYREL) 50 MG Tab, Take 1 Tab by mouth at bedtime as needed for Sleep., Disp: 30 Tab, Rfl: 6  •  sumatriptan (IMITREX) 100 MG tablet, Take 1 Tab by mouth Once PRN for Migraine., Disp: 10 Tab, Rfl: 3  •  cyclobenzaprine (FLEXERIL) 10 MG Tab, Take 1 Tab by mouth 3 times a day as needed., Disp: 30 Tab, Rfl: 3  •  estradiol (YUVAFEM) 10 MCG Tab, Insert 1 Tab in  vagina every 72 hours., Disp: 30 Tab, Rfl: 6  •  alendronate (FOSAMAX) 70 MG Tab, Take 1 Tab by mouth every 7 days., Disp: 4 Tab, Rfl: 6  •  atorvastatin (LIPITOR) 10 MG Tab, Take 1 Tab by mouth every bedtime., Disp: 90 Tab, Rfl: 3  •  indomethacin SR (INDOCIN SR) 75 MG Cap CR, Take 1 Cap by mouth every day., Disp: 90 Cap, Rfl: 3  •  Alendronate Sodium (FOSAMAX PO), Take  by mouth., Disp: , Rfl:   •  SHINGRIX 50 MCG/0.5ML Recon Susp, , Disp: , Rfl:     No Known Allergies    Past Medical History:   Diagnosis Date   • Fuchs' corneal dystrophy 2018   • Giant cell tumor of bone 2006    removal from L distal femur   • Hypertension    • Hypnic headache    • Migraine    • Thyroid nodule      Past Surgical History:   Procedure Laterality Date   • OTHER      giant cell tumor removed rom Left distal femur   • FUSION      cervical fusion C4-6   • ABDOMINAL HYSTERECTOMY TOTAL  1985    partial , for fibroids   • OTHER ORTHOPEDIC SURGERY      right wrist/base of thumb surgery for arthritis     Family History   Problem Relation Age of Onset   • Breast Cancer Mother 30         at 63 yr   • Cancer Father         metastatic; Lung   • Heart Disease Father    • Hypertension Father    • Stroke Father    • Cancer Sister         breast   • Diabetes Sister    • Diabetes Maternal Grandmother      Social History     Socioeconomic History   • Marital status:      Spouse name: Not on file   • Number of children: 2   • Years of education: Ass college   • Highest education level: Not on file   Occupational History     Comment: Retired Computer work   Social Needs   • Financial resource strain: Not on file   • Food insecurity:     Worry: Not on file     Inability: Not on file   • Transportation needs:     Medical: Not on file     Non-medical: Not on file   Tobacco Use   • Smoking status: Never Smoker   • Smokeless tobacco: Never Used   Substance and Sexual Activity   • Alcohol use: Yes     Frequency: Monthly or less      "Comment: 1 per month   • Drug use: No   • Sexual activity: Yes     Partners: Male     Comment: , homemaker   Lifestyle   • Physical activity:     Days per week: Not on file     Minutes per session: Not on file   • Stress: Not on file   Relationships   • Social connections:     Talks on phone: Not on file     Gets together: Not on file     Attends Religion service: Not on file     Active member of club or organization: Not on file     Attends meetings of clubs or organizations: Not on file     Relationship status: Not on file   • Intimate partner violence:     Fear of current or ex partner: Not on file     Emotionally abused: Not on file     Physically abused: Not on file     Forced sexual activity: Not on file   Other Topics Concern   •  Service Not Asked   • Blood Transfusions Not Asked   • Caffeine Concern Not Asked   • Occupational Exposure Not Asked   • Hobby Hazards Not Asked   • Sleep Concern Not Asked   • Stress Concern Not Asked   • Weight Concern Not Asked   • Special Diet Not Asked   • Back Care Not Asked   • Exercise Yes   • Bike Helmet Not Asked   • Seat Belt Yes   • Self-Exams Not Asked   Social History Narrative    Lives with        Objective:     Vitals: /70   Pulse 70   Temp 36.5 °C (97.7 °F)   Resp 16   Ht 1.676 m (5' 6\")   Wt 65.8 kg (145 lb)   SpO2 97%   BMI 23.40 kg/m²    General: Alert, pleasant, NAD  HEENT: Normocephalic.   Skin: Warm, dry, no rashes.  Extremities: No leg edema. No discoloration  Neurological: No tremors  Psych:  Affect/mood is normal, judgement is good, memory is intact, grooming is appropriate.    Assessment/Plan:     Diagnoses and all orders for this visit:    Migraine without aura and without status migrainosus, not intractable  Chronic.  Imitrex effective.  Has started to increase in frequency of the last 1 to 2 weeks.  Have discussed possible sources such as parametric pressure changes, inflammation in her sinuses due to allergies, and " a recent upper respiratory infection, changes in her diet, sleep, stress.  Encouraged her to continue to keep track of this.  Recommend follow-up with neurology.  Also recommended her to try to reduce the propranolol to 20 mgf twice a day since her blood pressure is slightly on the lower end for her normal.  Asymptomatic.  If she starts to have more migraines she may resume the previous dose.    -     REFERRAL TO NEUROLOGY    Hypnic headache  Chronic.  Have discussed with patient following up with neurology for further work-up and possible treatment.  Continue to keep track of her hypnic headaches.  -     REFERRAL TO NEUROLOGY      No follow-ups on file.      Jeanine ABRAHAM.

## 2019-12-06 ENCOUNTER — APPOINTMENT (RX ONLY)
Dept: URBAN - METROPOLITAN AREA CLINIC 20 | Facility: CLINIC | Age: 59
Setting detail: DERMATOLOGY
End: 2019-12-06

## 2019-12-06 DIAGNOSIS — L81.4 OTHER MELANIN HYPERPIGMENTATION: ICD-10-CM

## 2019-12-06 DIAGNOSIS — Z71.89 OTHER SPECIFIED COUNSELING: ICD-10-CM

## 2019-12-06 DIAGNOSIS — L82.1 OTHER SEBORRHEIC KERATOSIS: ICD-10-CM

## 2019-12-06 DIAGNOSIS — D22 MELANOCYTIC NEVI: ICD-10-CM

## 2019-12-06 DIAGNOSIS — D18.0 HEMANGIOMA: ICD-10-CM

## 2019-12-06 PROBLEM — D18.01 HEMANGIOMA OF SKIN AND SUBCUTANEOUS TISSUE: Status: ACTIVE | Noted: 2019-12-06

## 2019-12-06 PROBLEM — D48.5 NEOPLASM OF UNCERTAIN BEHAVIOR OF SKIN: Status: ACTIVE | Noted: 2019-12-06

## 2019-12-06 PROBLEM — D22.5 MELANOCYTIC NEVI OF TRUNK: Status: ACTIVE | Noted: 2019-12-06

## 2019-12-06 PROCEDURE — ? BIOPSY BY SHAVE METHOD

## 2019-12-06 PROCEDURE — 99203 OFFICE O/P NEW LOW 30 MIN: CPT | Mod: 25

## 2019-12-06 PROCEDURE — 11102 TANGNTL BX SKIN SINGLE LES: CPT

## 2019-12-06 PROCEDURE — ? COUNSELING

## 2019-12-06 PROCEDURE — 11103 TANGNTL BX SKIN EA SEP/ADDL: CPT

## 2019-12-06 ASSESSMENT — LOCATION SIMPLE DESCRIPTION DERM
LOCATION SIMPLE: RIGHT CALF
LOCATION SIMPLE: LEFT FOREARM
LOCATION SIMPLE: RIGHT POSTERIOR UPPER ARM
LOCATION SIMPLE: UPPER BACK
LOCATION SIMPLE: CHEST
LOCATION SIMPLE: INFERIOR FOREHEAD
LOCATION SIMPLE: RIGHT FOREARM

## 2019-12-06 ASSESSMENT — LOCATION DETAILED DESCRIPTION DERM
LOCATION DETAILED: RIGHT DISTAL CALF
LOCATION DETAILED: RIGHT VENTRAL PROXIMAL FOREARM
LOCATION DETAILED: LEFT VENTRAL PROXIMAL FOREARM
LOCATION DETAILED: RIGHT PROXIMAL POSTERIOR UPPER ARM
LOCATION DETAILED: LOWER STERNUM
LOCATION DETAILED: INFERIOR MID FOREHEAD
LOCATION DETAILED: INFERIOR THORACIC SPINE
LOCATION DETAILED: SUPERIOR THORACIC SPINE

## 2019-12-06 ASSESSMENT — LOCATION ZONE DERM
LOCATION ZONE: LEG
LOCATION ZONE: ARM
LOCATION ZONE: FACE
LOCATION ZONE: TRUNK

## 2019-12-06 NOTE — PROCEDURE: BIOPSY BY SHAVE METHOD
Consent: Written consent was obtained and risks were reviewed including but not limited to scarring, infection, bleeding, scabbing, incomplete removal, nerve damage and allergy to anesthesia.
X Size Of Lesion In Cm: 0
Electrodesiccation Text: The wound bed was treated with electrodesiccation after the biopsy was performed.
Depth Of Biopsy: dermis
Bill For Surgical Tray: no
Size Of Lesion In Cm: 0.4
Notification Instructions: Patient will be notified of biopsy results. However, patient instructed to call the office if not contacted within 2 weeks.
Dressing: pressure dressing with telfa
Render Post-Care Instructions In Note?: yes
Anesthesia Volume In Cc: 1
Electrodesiccation And Curettage Text: The wound bed was treated with electrodesiccation and curettage after the biopsy was performed.
Type Of Destruction Used: Curettage
Biopsy Type: H and E
Curettage Text: The wound bed was treated with curettage after the biopsy was performed.
Billing Type: Third-Party Bill
Detail Level: Detailed
Biopsy Method: Personna blade
Hemostasis: Drysol
Silver Nitrate Text: The wound bed was treated with silver nitrate after the biopsy was performed.
Lab Facility: 
Cryotherapy Text: The wound bed was treated with cryotherapy after the biopsy was performed.
Wound Care: Petrolatum
Anesthesia Type: 1% lidocaine with 1:100,000 epinephrine and a 1:10 solution of 8.4% sodium bicarbonate
Post-Care Instructions: I reviewed with the patient in detail post-care instructions. Patient is to keep the biopsy site dry overnight. Gentle cleansing daily.  Apply petroleum ointment daily until healed. Patient may apply hydrogen peroxide soaks to remove any crusting.
Lab: [id number]
X Size Of Lesion In Cm: 0.2

## 2019-12-17 ENCOUNTER — RX ONLY (OUTPATIENT)
Age: 59
Setting detail: RX ONLY
End: 2019-12-17

## 2019-12-17 RX ORDER — IMIQUIMOD 50 MG/G
CREAM TOPICAL
Qty: 3 | Refills: 0 | Status: ERX | COMMUNITY
Start: 2019-12-17

## 2020-01-07 ENCOUNTER — OFFICE VISIT (OUTPATIENT)
Dept: MEDICAL GROUP | Facility: MEDICAL CENTER | Age: 60
End: 2020-01-07
Payer: COMMERCIAL

## 2020-01-07 ENCOUNTER — HOSPITAL ENCOUNTER (OUTPATIENT)
Dept: RADIOLOGY | Facility: MEDICAL CENTER | Age: 60
End: 2020-01-07
Attending: NURSE PRACTITIONER
Payer: COMMERCIAL

## 2020-01-07 VITALS
OXYGEN SATURATION: 96 % | RESPIRATION RATE: 16 BRPM | TEMPERATURE: 98.1 F | DIASTOLIC BLOOD PRESSURE: 70 MMHG | HEART RATE: 72 BPM | BODY MASS INDEX: 23.14 KG/M2 | SYSTOLIC BLOOD PRESSURE: 110 MMHG | HEIGHT: 66 IN | WEIGHT: 144 LBS

## 2020-01-07 DIAGNOSIS — M79.661 RIGHT CALF PAIN: ICD-10-CM

## 2020-01-07 DIAGNOSIS — H18.519 FUCHS' CORNEAL DYSTROPHY: ICD-10-CM

## 2020-01-07 DIAGNOSIS — Z11.59 ENCOUNTER FOR HEPATITIS C SCREENING TEST FOR LOW RISK PATIENT: ICD-10-CM

## 2020-01-07 PROCEDURE — 99214 OFFICE O/P EST MOD 30 MIN: CPT | Performed by: NURSE PRACTITIONER

## 2020-01-07 PROCEDURE — 93971 EXTREMITY STUDY: CPT | Mod: RT

## 2020-01-07 PROCEDURE — 93971 EXTREMITY STUDY: CPT | Mod: 26,RT | Performed by: INTERNAL MEDICINE

## 2020-01-07 ASSESSMENT — PATIENT HEALTH QUESTIONNAIRE - PHQ9: CLINICAL INTERPRETATION OF PHQ2 SCORE: 0

## 2020-01-07 NOTE — LETTER
[facility]  NANETTE Ravi.P.R.N.  [address]  [place] [address]  Fax: [phone]   Authorization for Release/Disclosure of   Protected Health Information   Name: MARYANN SOUTH : 1960 SSN: [id number]   Address: [address] [place] [address] [place] [address] Phone:    [phone] (home)    I authorize the entity listed below to release/disclose the PHI below to:   [facility]/Jeanine Dorado A.P.R.N. and NANETTE Ravi.P.R.N.   Provider or Entity Name:  [facility], [facility], [facility]   Phone:      Fax:     Reason for request: continuity of care   Information to be released:    [  ] LAST COLONOSCOPY,  including any PATH REPORT and follow-up  [  ] LAST FIT/COLOGUARD RESULT [x  ] LAST DEXA  [x  ] LAST MAMMOGRAM  [  ] LAST PAP  [  ] LAST LABS [  ] RETINA EXAM REPORT  [  ] IMMUNIZATION RECORDS  [  ] Release all info      [  ] Check here and initial the line next to each item to release ALL health information INCLUDING  _____ Care and treatment for drug and / or alcohol abuse  _____ HIV testing, infection status, or AIDS  _____ Genetic Testing    DATES OF SERVICE OR TIME PERIOD TO BE DISCLOSED: _____________  I understand and acknowledge that:  * This Authorization may be revoked at any time by you in writing, except if your health information has already been used or disclosed.  * Your health information that will be used or disclosed as a result of you signing this authorization could be re-disclosed by the recipient. If this occurs, your re-disclosed health information may no longer be protected by State or Federal laws.  * You may refuse to sign this Authorization. Your refusal will not affect your ability to obtain treatment.  * This Authorization becomes effective upon signing and will  on (date) __________.      If no date is indicated, this Authorization will  one (1) year from the signature date.    Name: Maryann South    Signature:   Date:          1/7/2020       PLEASE FAX REQUESTED RECORDS BACK TO: [phone]

## 2020-01-07 NOTE — LETTER
[facility]  Jeanine Dorado A.P.R.N.  [address]  [place] [address]  Fax: [phone]   Authorization for Release/Disclosure of   Protected Health Information   Name: MARYANN SOUTH : 1960 SSN: [id number]   Address: [address] [place] [address] [facility] [facility] [address] Phone:    [phone] (home)    I authorize the entity listed below to release/disclose the PHI below to:   [facility]/Jeanine Dorado, A.P.R.N. and Jeanine Dorado A.P.R.N.   Provider or Entity Name:  Dr. Preethi Marquis   Address   [facility], [facility], [facility]   Phone:      Fax:     Reason for request: continuity of care   Information to be released:    [  ] LAST COLONOSCOPY,  including any PATH REPORT and follow-up  [  ] LAST FIT/COLOGUARD RESULT [x  ] LAST DEXA  [ x ] LAST MAMMOGRAM  [  ] LAST PAP  [  ] LAST LABS [  ] RETINA EXAM REPORT  [  ] IMMUNIZATION RECORDS  [  ] Release all info      [  ] Check here and initial the line next to each item to release ALL health information INCLUDING  _____ Care and treatment for drug and / or alcohol abuse  _____ HIV testing, infection status, or AIDS  _____ Genetic Testing    DATES OF SERVICE OR TIME PERIOD TO BE DISCLOSED: _____________  I understand and acknowledge that:  * This Authorization may be revoked at any time by you in writing, except if your health information has already been used or disclosed.  * Your health information that will be used or disclosed as a result of you signing this authorization could be re-disclosed by the recipient. If this occurs, your re-disclosed health information may no longer be protected by State or Federal laws.  * You may refuse to sign this Authorization. Your refusal will not affect your ability to obtain treatment.  * This Authorization becomes effective upon signing and will  on (date) __________.      If no date is indicated, this Authorization will  one (1) year from the signature date.    Name: Maryann South    Signature:   Date:     1/7/2020       PLEASE FAX REQUESTED RECORDS BACK TO: [phone]

## 2020-01-07 NOTE — PROGRESS NOTES
cc:  Right calf pain      Subjective:     HPI:     Maryann Ashley is a 59 y.o. female here to discuss the evaluation and management of:    1. Right calf pain  Patient presents today with complaints of having right calf pain for approximately 1 week.  Denies any trauma, injury.  Did have a prolonged car ride from recently on New Year's.  States it feels like a big bruise on the back of her right calf.  There is no redness, crepitus, evidence of any bruising.  She does have pain with dorsiflexion her foot.  No recent strenuous exercise.  Denies any fevers.  Nontobacco user.  Has not happened in the past.      2. Fuchs' corneal dystrophy  Needs referral to her ophthalmologist.      ROS:  Denies any Headache, Blurred Vision, Confusion, Chest pain,  Shortness of breath,  Abdominal pain, Changes of bowel or bladder, Lower ext edema, Fevers, Nights sweats, Weight Changes, Focal weakness or numbness.  And all other systems reviewed and are negative.        Current Outpatient Medications:   •  propranolol (INDERAL) 40 MG Tab, Take 1 Tab by mouth 2 times a day., Disp: 60 Tab, Rfl: 6  •  traZODone (DESYREL) 50 MG Tab, Take 1 Tab by mouth at bedtime as needed for Sleep., Disp: 30 Tab, Rfl: 6  •  sumatriptan (IMITREX) 100 MG tablet, Take 1 Tab by mouth Once PRN for Migraine., Disp: 10 Tab, Rfl: 3  •  cyclobenzaprine (FLEXERIL) 10 MG Tab, Take 1 Tab by mouth 3 times a day as needed., Disp: 30 Tab, Rfl: 3  •  estradiol (YUVAFEM) 10 MCG Tab, Insert 1 Tab in vagina every 72 hours., Disp: 30 Tab, Rfl: 6  •  alendronate (FOSAMAX) 70 MG Tab, Take 1 Tab by mouth every 7 days., Disp: 4 Tab, Rfl: 6  •  atorvastatin (LIPITOR) 10 MG Tab, Take 1 Tab by mouth every bedtime., Disp: 90 Tab, Rfl: 3  •  indomethacin SR (INDOCIN SR) 75 MG Cap CR, Take 1 Cap by mouth every day., Disp: 90 Cap, Rfl: 3  •  Alendronate Sodium (FOSAMAX PO), Take  by mouth., Disp: , Rfl:   •  SHINGRIX 50 MCG/0.5ML Recon Susp, , Disp: , Rfl:     No Known  Allergies    Past Medical History:   Diagnosis Date   • Fuchs' corneal dystrophy 2018   • Giant cell tumor of bone 2006    removal from L distal femur   • Hypertension    • Hypnic headache    • Migraine    • Thyroid nodule      Past Surgical History:   Procedure Laterality Date   • OTHER      giant cell tumor removed rom Left distal femur   • FUSION      cervical fusion C4-6   • ABDOMINAL HYSTERECTOMY TOTAL  1985    partial , for fibroids   • OTHER ORTHOPEDIC SURGERY      right wrist/base of thumb surgery for arthritis     Family History   Problem Relation Age of Onset   • Breast Cancer Mother 30         at 63 yr   • Cancer Father         metastatic; Lung   • Heart Disease Father    • Hypertension Father    • Stroke Father    • Cancer Sister         breast   • Diabetes Sister    • Diabetes Maternal Grandmother      Social History     Socioeconomic History   • Marital status:      Spouse name: Not on file   • Number of children: 2   • Years of education: Ass college   • Highest education level: Not on file   Occupational History     Comment: Retired Computer work   Social Needs   • Financial resource strain: Not on file   • Food insecurity:     Worry: Not on file     Inability: Not on file   • Transportation needs:     Medical: Not on file     Non-medical: Not on file   Tobacco Use   • Smoking status: Never Smoker   • Smokeless tobacco: Never Used   Substance and Sexual Activity   • Alcohol use: Yes     Frequency: Monthly or less     Comment: 1 per month   • Drug use: No   • Sexual activity: Yes     Partners: Male     Comment: , homemaker   Lifestyle   • Physical activity:     Days per week: Not on file     Minutes per session: Not on file   • Stress: Not on file   Relationships   • Social connections:     Talks on phone: Not on file     Gets together: Not on file     Attends Mosque service: Not on file     Active member of club or organization: Not on file     Attends meetings of  "clubs or organizations: Not on file     Relationship status: Not on file   • Intimate partner violence:     Fear of current or ex partner: Not on file     Emotionally abused: Not on file     Physically abused: Not on file     Forced sexual activity: Not on file   Other Topics Concern   •  Service Not Asked   • Blood Transfusions Not Asked   • Caffeine Concern Not Asked   • Occupational Exposure Not Asked   • Hobby Hazards Not Asked   • Sleep Concern Not Asked   • Stress Concern Not Asked   • Weight Concern Not Asked   • Special Diet Not Asked   • Back Care Not Asked   • Exercise Yes   • Bike Helmet Not Asked   • Seat Belt Yes   • Self-Exams Not Asked   Social History Narrative    Lives with        Objective:     Vitals: /70   Pulse 72   Temp 36.7 °C (98.1 °F)   Resp 16   Ht 1.676 m (5' 6\")   Wt 65.3 kg (144 lb)   SpO2 96%   BMI 23.24 kg/m²    General: Alert, pleasant, NAD  HEENT: Normocephalic.    Skin: Warm, dry, no rashes.  Extremities: No leg edema. No discoloration.  No visible swelling on her right lower leg, equal in size compared to her left leg.  Neurological: No tremors  Psych:  Affect/mood is normal, judgement is good, memory is intact, grooming is appropriate.    Assessment/Plan:     Maryann was seen today for leg pain.    Diagnoses and all orders for this visit:    Right calf pain  Patient will obtain ultrasound of her right lower leg.  We will call her with the results.  Rule out DVT.  -     US-EXTREMITY VENOUS LOWER UNILAT RIGHT; Future    Fuchs' corneal dystrophy  -     REFERRAL TO OPHTHALMOLOGY    Encounter for hepatitis C screening test for low risk patient  -     HEP C VIRUS ANTIBODY; Future      No follow-ups on file.          Jeanine SOSA  "

## 2020-01-09 ENCOUNTER — APPOINTMENT (RX ONLY)
Dept: URBAN - METROPOLITAN AREA CLINIC 22 | Facility: CLINIC | Age: 60
Setting detail: DERMATOLOGY
End: 2020-01-09

## 2020-01-09 PROBLEM — C44.519 BASAL CELL CARCINOMA OF SKIN OF OTHER PART OF TRUNK: Status: ACTIVE | Noted: 2020-01-09

## 2020-01-09 PROCEDURE — 17261 DSTRJ MAL LES T/A/L .6-1.0CM: CPT

## 2020-01-09 PROCEDURE — ? CURETTAGE AND DESTRUCTION

## 2020-01-09 NOTE — PROCEDURE: CURETTAGE AND DESTRUCTION
Anesthesia Volume In Cc: 3
Size Of Lesion In Cm: 0.6
Add Intralesional Injection: No
Total Volume (Ccs): 1
Detail Level: Detailed
Biopsy Photograph Reviewed: Yes
Post-Care Instructions: I reviewed with the patient in detail post-care instructions. Patient is to keep the area dry for 48 hours, and not to engage in any swimming until the area is healed. Should the patient develop any fevers, chills, bleeding, severe pain patient will contact the office immediately.
Bill As A Line Item Or As Units: Line Item
What Was Performed First?: Curettage
Additional Information: (Optional): The wound was cleaned, and a pressure dressing was applied.  The patient received detailed post-op instructions.
Consent was obtained from the patient. The risks, benefits and alternatives to therapy were discussed in detail. Specifically, the risks of infection, scarring, bleeding, prolonged wound healing, nerve injury, incomplete removal, allergy to anesthesia and recurrence were addressed. Alternatives to ED&C, such as: surgical removal and XRT were also discussed.  Prior to the procedure, the treatment site was clearly identified and confirmed by the patient. All components of Universal Protocol/PAUSE Rule completed.
Size Of Lesion After Curettage: 0.8
Cautery Type: electrodesiccation
Anesthesia Type: 1% lidocaine with epinephrine

## 2020-02-19 DIAGNOSIS — H18.519 FUCHS' CORNEAL DYSTROPHY: ICD-10-CM

## 2020-03-09 ENCOUNTER — HOSPITAL ENCOUNTER (OUTPATIENT)
Dept: HOSPITAL 8 - CFH | Age: 60
Discharge: HOME | End: 2020-03-09
Attending: OBSTETRICS & GYNECOLOGY
Payer: COMMERCIAL

## 2020-03-09 DIAGNOSIS — Z80.3: ICD-10-CM

## 2020-03-09 DIAGNOSIS — N60.01: Primary | ICD-10-CM

## 2020-03-09 DIAGNOSIS — Z91.89: ICD-10-CM

## 2020-03-09 PROCEDURE — A9575 INJ GADOTERATE MEGLUMI 0.1ML: HCPCS

## 2020-03-09 PROCEDURE — 77049 MRI BREAST C-+ W/CAD BI: CPT

## 2020-03-09 PROCEDURE — C8908 MRI W/O FOL W/CONT, BREAST,: HCPCS

## 2020-03-26 ENCOUNTER — OFFICE VISIT (OUTPATIENT)
Dept: MEDICAL GROUP | Facility: MEDICAL CENTER | Age: 60
End: 2020-03-26
Payer: COMMERCIAL

## 2020-03-26 VITALS
DIASTOLIC BLOOD PRESSURE: 80 MMHG | OXYGEN SATURATION: 98 % | HEIGHT: 66 IN | RESPIRATION RATE: 16 BRPM | HEART RATE: 68 BPM | SYSTOLIC BLOOD PRESSURE: 122 MMHG | TEMPERATURE: 98.2 F | WEIGHT: 145 LBS | BODY MASS INDEX: 23.3 KG/M2

## 2020-03-26 DIAGNOSIS — L24.9 IRRITANT CONTACT DERMATITIS, UNSPECIFIED TRIGGER: ICD-10-CM

## 2020-03-26 PROCEDURE — 99214 OFFICE O/P EST MOD 30 MIN: CPT | Performed by: NURSE PRACTITIONER

## 2020-03-26 RX ORDER — TRIAMCINOLONE ACETONIDE 1 MG/G
1 CREAM TOPICAL 2 TIMES DAILY
Qty: 1 TUBE | Refills: 0 | Status: SHIPPED | OUTPATIENT
Start: 2020-03-26 | End: 2020-04-02

## 2020-03-26 NOTE — PROGRESS NOTES
"cc:  Rash on hands    Subjective:     HPI:     Maryann Ashley is a 59 y.o. female here to discuss the evaluation and management of:    Patient presents with complaints of having dry red sore spots on her knuckles for several days. States her knuckle were so dry they had bled.  Also noticed about a month ago she had some \"little cuts\" on her left palm-about the size of a quarter. These also feel very dry and irritated. Denies any new medication, topicals, detergents or chemical contacts. Has been washing her hands more than normal. Using lotion and some homeopathic cream however that made it sting. Has not happened in the past, no fevers, no joint pain, no blister presentation.       ROS:  Denies any Headache, Blurred Vision, Confusion, Chest pain,  Shortness of breath,  Abdominal pain, Changes of bowel or bladder, Lower ext edema, Fevers, Nights sweats, Weight Changes, Focal weakness or numbness.  And all other systems reviewed and are all negative.        Current Outpatient Medications:   •  triamcinolone acetonide (KENALOG) 0.1 % Cream, Apply 1 g to affected area(s) 2 times a day for 7 days., Disp: 1 Tube, Rfl: 0  •  propranolol (INDERAL) 40 MG Tab, Take 1 Tab by mouth 2 times a day., Disp: 60 Tab, Rfl: 6  •  traZODone (DESYREL) 50 MG Tab, Take 1 Tab by mouth at bedtime as needed for Sleep., Disp: 30 Tab, Rfl: 6  •  sumatriptan (IMITREX) 100 MG tablet, Take 1 Tab by mouth Once PRN for Migraine., Disp: 10 Tab, Rfl: 3  •  cyclobenzaprine (FLEXERIL) 10 MG Tab, Take 1 Tab by mouth 3 times a day as needed., Disp: 30 Tab, Rfl: 3  •  estradiol (YUVAFEM) 10 MCG Tab, Insert 1 Tab in vagina every 72 hours., Disp: 30 Tab, Rfl: 6  •  alendronate (FOSAMAX) 70 MG Tab, Take 1 Tab by mouth every 7 days., Disp: 4 Tab, Rfl: 6  •  atorvastatin (LIPITOR) 10 MG Tab, Take 1 Tab by mouth every bedtime., Disp: 90 Tab, Rfl: 3  •  indomethacin SR (INDOCIN SR) 75 MG Cap CR, Take 1 Cap by mouth every day., Disp: 90 Cap, Rfl: 3  •  " Alendronate Sodium (FOSAMAX PO), Take  by mouth., Disp: , Rfl:   •  SHINGRIX 50 MCG/0.5ML Recon Susp, , Disp: , Rfl:     No Known Allergies    Past Medical History:   Diagnosis Date   • Fuchs' corneal dystrophy 2018   • Giant cell tumor of bone 2006    removal from L distal femur   • Hypertension    • Hypnic headache    • Migraine    • Thyroid nodule      Past Surgical History:   Procedure Laterality Date   • OTHER      giant cell tumor removed rom Left distal femur   • FUSION      cervical fusion C4-6   • ABDOMINAL HYSTERECTOMY TOTAL  1985    partial , for fibroids   • OTHER ORTHOPEDIC SURGERY      right wrist/base of thumb surgery for arthritis     Family History   Problem Relation Age of Onset   • Breast Cancer Mother 30         at 63 yr   • Cancer Father         metastatic; Lung   • Heart Disease Father    • Hypertension Father    • Stroke Father    • Cancer Sister         breast   • Diabetes Sister    • Diabetes Maternal Grandmother      Social History     Socioeconomic History   • Marital status:      Spouse name: Not on file   • Number of children: 2   • Years of education: Ass college   • Highest education level: Not on file   Occupational History     Comment: Retired Computer work   Social Needs   • Financial resource strain: Not on file   • Food insecurity     Worry: Not on file     Inability: Not on file   • Transportation needs     Medical: Not on file     Non-medical: Not on file   Tobacco Use   • Smoking status: Never Smoker   • Smokeless tobacco: Never Used   Substance and Sexual Activity   • Alcohol use: Yes     Frequency: Monthly or less     Comment: 1 per month   • Drug use: No   • Sexual activity: Yes     Partners: Male     Comment: , homemaker   Lifestyle   • Physical activity     Days per week: Not on file     Minutes per session: Not on file   • Stress: Not on file   Relationships   • Social connections     Talks on phone: Not on file     Gets together: Not on  "file     Attends Restorationist service: Not on file     Active member of club or organization: Not on file     Attends meetings of clubs or organizations: Not on file     Relationship status: Not on file   • Intimate partner violence     Fear of current or ex partner: Not on file     Emotionally abused: Not on file     Physically abused: Not on file     Forced sexual activity: Not on file   Other Topics Concern   •  Service Not Asked   • Blood Transfusions Not Asked   • Caffeine Concern Not Asked   • Occupational Exposure Not Asked   • Hobby Hazards Not Asked   • Sleep Concern Not Asked   • Stress Concern Not Asked   • Weight Concern Not Asked   • Special Diet Not Asked   • Back Care Not Asked   • Exercise Yes   • Bike Helmet Not Asked   • Seat Belt Yes   • Self-Exams Not Asked   Social History Narrative    Lives with        Objective:     Vitals: /80   Pulse 68   Temp 36.8 °C (98.2 °F)   Resp 16   Ht 1.676 m (5' 6\")   Wt 65.8 kg (145 lb)   SpO2 98%   BMI 23.40 kg/m²    General: Alert, pleasant, NAD  HEENT: Normocephalic.   Skin: Warm, dry, red, cracked bilateral knuckles, left palm with red, dry, small fissures-area about the size of a quarter.  Extremities: No leg edema. No discoloration  Neurological: No tremors  Psych:  Affect/mood is normal, judgement is good, memory is intact, grooming is appropriate.    Assessment/Plan:     Diagnoses and all orders for this visit:    Irritant contact dermatitis, unspecified trigger  Due to increase in hand washing. Use Aquaphor after bath/shower daily. Stop lotion. Use triamcinolone for worse area.   -     triamcinolone acetonide (KENALOG) 0.1 % Cream; Apply 1 g to affected area(s) 2 times a day for 7 days.      No follow-ups on file.          Jeanine SOSA"

## 2020-03-27 PROBLEM — M54.2 NECK PAIN, BILATERAL POSTERIOR: Status: RESOLVED | Noted: 2018-11-08 | Resolved: 2020-03-27

## 2020-03-27 PROBLEM — G43.009 MIGRAINE WITHOUT AURA AND WITHOUT STATUS MIGRAINOSUS, NOT INTRACTABLE: Chronic | Status: ACTIVE | Noted: 2019-10-31

## 2020-03-27 PROBLEM — H18.519 FUCHS' CORNEAL DYSTROPHY: Chronic | Status: ACTIVE | Noted: 2018-01-23

## 2020-06-05 ENCOUNTER — DOCUMENTATION (OUTPATIENT)
Dept: MEDICAL GROUP | Facility: MEDICAL CENTER | Age: 60
End: 2020-06-05

## 2020-06-05 DIAGNOSIS — M79.673 PAIN OF FOOT, UNSPECIFIED LATERALITY: ICD-10-CM

## 2020-06-23 DIAGNOSIS — G44.81 HYPNIC HEADACHE: ICD-10-CM

## 2020-06-24 RX ORDER — SUMATRIPTAN 100 MG/1
100 TABLET, FILM COATED ORAL
Qty: 10 TAB | Refills: 3 | Status: SHIPPED | OUTPATIENT
Start: 2020-06-24 | End: 2020-10-20 | Stop reason: SDUPTHER

## 2020-06-30 ENCOUNTER — HOSPITAL ENCOUNTER (OUTPATIENT)
Dept: RADIOLOGY | Facility: MEDICAL CENTER | Age: 60
End: 2020-06-30
Payer: COMMERCIAL

## 2020-07-06 ENCOUNTER — OFFICE VISIT (OUTPATIENT)
Dept: NEUROLOGY | Facility: MEDICAL CENTER | Age: 60
End: 2020-07-06
Payer: COMMERCIAL

## 2020-07-06 VITALS
RESPIRATION RATE: 18 BRPM | HEIGHT: 66 IN | BODY MASS INDEX: 23.3 KG/M2 | DIASTOLIC BLOOD PRESSURE: 68 MMHG | WEIGHT: 145 LBS | HEART RATE: 76 BPM | OXYGEN SATURATION: 96 % | SYSTOLIC BLOOD PRESSURE: 118 MMHG

## 2020-07-06 DIAGNOSIS — G44.81 HYPNIC HEADACHE: Chronic | ICD-10-CM

## 2020-07-06 DIAGNOSIS — G43.009 MIGRAINE WITHOUT AURA AND WITHOUT STATUS MIGRAINOSUS, NOT INTRACTABLE: Chronic | ICD-10-CM

## 2020-07-06 PROCEDURE — 99205 OFFICE O/P NEW HI 60 MIN: CPT | Performed by: PSYCHIATRY & NEUROLOGY

## 2020-07-06 ASSESSMENT — ENCOUNTER SYMPTOMS
HALLUCINATIONS: 0
WEIGHT LOSS: 0
BRUISES/BLEEDS EASILY: 0
SORE THROAT: 0
FEVER: 0
ABDOMINAL PAIN: 0
SHORTNESS OF BREATH: 0
EYE DISCHARGE: 0
FALLS: 0

## 2020-07-06 NOTE — PROGRESS NOTES
Chief Complaint   Patient presents with   • New Patient     patient states she is getting headaches in the middle of the night and wakes her up, patient is on Imatrex right now states that it helps.      Patient is referred by Jeanine Dorado for initial consult.    History of present illness:  Maryann Ashley 59 y.o. female presents today for headaches.   History is obtained from patient.  and Patient is accompanied by self.  Her  is also my patient.    Duration/timing: onset earlier adult life with worsening later on in life about 2009; about 10 or less headache days a month perhaps   Context: Migraines  Location: left temporal  Quality: dull pounding  Severity: severe  Modifying factors: worse with aspartane; improved with stopping aspartane   Associated signs/symptoms: light sensitivity  Denies: bladder incontinence, bowel incontinence, dizziness, vision changes/loss or diplopia, head trauma , weakness, numbness/tingling, swallowing difficulties, speech disturbance, incoordination, depression, anxiety, hearing loss, loss of consciousness, hallucinations, abnormal movements and falls     Duration/timing: onset 2009, occurs at 2:58 in the morning  Context: Hypnic headaches  Location: left temporal  Quality: sharp  Severity: moderate to severe  Modifying factors: see below  Associated signs/symptoms: none  Denies: As above    Patient has tried:  -Indomethacin - 75mg nightly taken with cup of coffee, initiated 2015, refilled by PCP  -Caffeine pills - caused more headaches  -Propranolol - 10mg BID, started years ago  -Imitrex - with good abortive effect takes 50mg (1/2 100mg) - 90% of the time  -Topiramate -caused abnormal mood      Past medical history:   Past Medical History:   Diagnosis Date   • Fuchs' corneal dystrophy 1/23/2018   • Giant cell tumor of bone 2006    removal from L distal femur   • Hypertension    • Hypnic headache    • Migraine    • Thyroid nodule         Past surgical history:    Past Surgical History:   Procedure Laterality Date   • OTHER      giant cell tumor removed rom Left distal femur   • FUSION      cervical fusion C4-6   • ABDOMINAL HYSTERECTOMY TOTAL  1985    partial , for fibroids   • OTHER ORTHOPEDIC SURGERY      right wrist/base of thumb surgery for arthritis       Family history:   Family History   Problem Relation Age of Onset   • Breast Cancer Mother 30         at 63 yr   • Cancer Father         metastatic; Lung   • Heart Disease Father    • Hypertension Father    • Stroke Father    • Cancer Sister         breast   • Diabetes Sister    • Diabetes Maternal Grandmother        Social history:   Tobacco Use   • Smoking status: Never Smoker   • Smokeless tobacco: Never Used   Substance and Sexual Activity   • Alcohol use: Yes     Frequency: Monthly or less     Comment: 1 per month   • Drug use: No   • Sexual activity: Yes     Partners: Male     Comment: , homemaker     Current medications:   Current Outpatient Medications   Medication   • sumatriptan (IMITREX) 100 MG tablet   • propranolol (INDERAL) 40 MG Tab   • traZODone (DESYREL) 50 MG Tab   • cyclobenzaprine (FLEXERIL) 10 MG Tab   • alendronate (FOSAMAX) 70 MG Tab   • atorvastatin (LIPITOR) 10 MG Tab   • indomethacin SR (INDOCIN SR) 75 MG Cap CR   • SHINGRIX 50 MCG/0.5ML Recon Susp     No current facility-administered medications for this visit.        Medication Allergy:  No Known Allergies    Review of Systems   Constitutional: Negative for fever and weight loss.   HENT: Negative for sore throat.    Eyes: Negative for discharge.   Respiratory: Negative for shortness of breath.    Cardiovascular: Negative for leg swelling.   Gastrointestinal: Negative for abdominal pain.   Genitourinary: Negative for dysuria.   Musculoskeletal: Negative for falls.   Skin: Negative for rash.   Neurological:        As per HPI   Endo/Heme/Allergies: Does not bruise/bleed easily.   Psychiatric/Behavioral: Negative for  "hallucinations.       Physical examination:   Vitals:    07/06/20 1410   BP: 118/68   BP Location: Left arm   Patient Position: Sitting   BP Cuff Size: Adult   Pulse: 76   Resp: 18   SpO2: 96%   Weight: 65.8 kg (145 lb)   Height: 1.676 m (5' 6\")     General: Patient in well nourished in no apparent distress.  Eyes: Ophthalmoscopic examination performed but discs cannot be visualized well enough to characterize bilaterally.  HENT: Normocephalic, atraumatic. Mallapatic score 2  Cardiovascular: No lower extremity edema.  Respiratory: Normal respiratory effort.   Skin: No appreciable signs of acute rashes or bruising.   Musculoskeletal: No signs of joint or muscle swelling.   Psychiatric: Pleasant.     NEUROLOGICAL EXAM:   Mental status: Awake, alert and fully oriented to person, place, time and situation. Normal attention, concentration and fund of knowledge for education level.   Speech and language: Speech is fluent without errors and clear.  Cranial nerve exam:  II: Pupils are equally round and minimally reactive to light, she was chemically dilated prior to this visit. Visual fields are intact by confrontation.  III, IV, VI: EOMI, no diplopia, no ptosis.  V: Sensation to light touch is normal over V1-3 distributions bilaterally.  .  VII: Facial movements are symmetrical. There is no facial droop. .  VIII: Hearing intact to soft speech and finger rub bilaterally  IX: Palate elevates symmetrically, uvula is midline. Dysarthria is not present.  XI: Shoulder shrug are symmetrical and strong.   XII: Tongue protrudes midline.    Motor exam:  Muscle tone is normal in all 4 limbs. and No abnormal movements appreciated.  Rapid finger tapping is equal bilaterally.    Muscle strength:     Right  Left  Deltoid   5/5  5/5      Biceps   5/5  5/5  Triceps  5/5  5/5   Wrist extensors 5/5  5/5  Wrist flexors  5/5  5/5     5/5  5/5  Interossei  5/5  5/5  Thenar (APB)  NT/5  NT/5   Hip " flexors  5/5  5/5  Quadriceps  5-/5  5-/5    Hamstrings  5-/5  5-/5  Dorsiflexors  5/5  5/5  Plantarflexors  5/5  5/5  Toe extension  NT/5  NT/5  NT = not tested    Sensory exam:  Intact to Light touch in bilateral upper and lower extremity.    Reflexes:       Right  Left  Biceps   0/4  0/4  Triceps  0/4  0/4  Brachioradialis 0/4  0/4  Knee jerk  1/4  1/4  Ankle jerk  1/4  1/4   bilateral toes are mute to plantar stimulation..      Coordination: shows a normal finger-nose-finger and heel to shin bilaterally.   Gait: Casual gait is normal.      ANCILLARY DATA REVIEWED:   Lab Data Review:  Lab Results   Component Value Date/Time    HBA1C 5.4 07/16/2018        Imaging:   MRI brain with without contrast in 2009:  MRI OF THE BRAIN WITHOUT AND WITH CONTRAST WITHIN NORMAL LIMITS. NO ACUTE HEMORRHAGE, MASS LESION OR EVIDENCE OF ACUTE INFARCTION.     Records reviewed: PCP note reviewed.  Last visit was in March 2020.      ASSESSMENT AND PLAN:    1. Hypnic headache: Prior diagnosis based on clinical history.  Patient has been chronically treated by primary care with daily indomethacin and a cup of coffee with good response.  Primary care does monitor her renal function intermittently.  Otherwise well controlled.  MRI brain with without contrast in 2009 at the onset of hypnic headache was unremarkable for secondary actual cause.  -Discussed other treatment options including but not limited to caffeine pills, daily caffeine amount recommended for treatment of hypnic headaches, other preventative options including but not limited to for topiramate and lamotrigine, risk/benefits of side effects and alternatives to those medications  -We will attempt a dose of caffeine pill or cup of coffee prior to bed to see if that is effective without use of indomethacin  -Agree with monitoring of renal function in the interim, will defer to primary care  -Patient on indomethacin 75 mg daily by PCP  -Sitter Zonegran for further preventative  treatment treatment    2. Migraine without aura and without status migrainosus, not intractable: Chronic history of.  Based on clinical history.  Patient has had acute worsening of her migraines likely secondary to aspartame with improve frequency and severity with cessation.  There are no red flag symptoms or focal exam findings to suggest secondary etiology.  Her MRI brain without contrast in 2009 also unremarkable for secondary structural cause.  -Propranolol 40 mg twice daily for headache prevention  -New sumatriptan 50 mg as needed, patient cannot take further NSAIDs given her indomethacin  -Avoid triggers    FOLLOW-UP: Return in about 3 months (around 10/6/2020).  For clinical monitoring, if doing well she is okay to push out her appointment to 6 months  EDUCATION AND COUNSELING:  -I personally discussed the following with the patient:   Risks/benefits/side effects/alternatives of medication including but not limited to drowsiness, sedation, dizziness, increased risk for falls, hypersensitivity reactions including rash (which may be fatal), weight changes, GI side effects (gastritis, ulcers, bleeding, changes in appetite, pancreatitis, change in bowel habits), liver dysfunction, increased risk for bleeding, increased risk for depression, anxiety, suicide, psychosis and mood changes and avoid abrupt cessation of medication. and Diagnosis, prognosis, and treatment options discussed with patient at length.  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 The patient and/or family further understands it is their responsibility to keep proper follow up.     Disclaimer  This dictation was created using voice recognition software. I have made every  reasonable attempt to avoid dictation errors, but this document may contain an error not identified before finalizing. If the error changes the accuracy of the document, I would appreciate it being brought to my attention. Thank you very much.     Priti De Guzman MD  Neurology  [facility]

## 2020-07-15 ENCOUNTER — APPOINTMENT (RX ONLY)
Dept: URBAN - METROPOLITAN AREA CLINIC 22 | Facility: CLINIC | Age: 60
Setting detail: DERMATOLOGY
End: 2020-07-15

## 2020-07-15 DIAGNOSIS — L70.8 OTHER ACNE: ICD-10-CM

## 2020-07-15 DIAGNOSIS — L81.4 OTHER MELANIN HYPERPIGMENTATION: ICD-10-CM

## 2020-07-15 DIAGNOSIS — Z85.828 PERSONAL HISTORY OF OTHER MALIGNANT NEOPLASM OF SKIN: ICD-10-CM

## 2020-07-15 DIAGNOSIS — L57.0 ACTINIC KERATOSIS: ICD-10-CM

## 2020-07-15 DIAGNOSIS — D22 MELANOCYTIC NEVI: ICD-10-CM

## 2020-07-15 DIAGNOSIS — L82.1 OTHER SEBORRHEIC KERATOSIS: ICD-10-CM

## 2020-07-15 PROBLEM — D48.5 NEOPLASM OF UNCERTAIN BEHAVIOR OF SKIN: Status: ACTIVE | Noted: 2020-07-15

## 2020-07-15 PROBLEM — D22.5 MELANOCYTIC NEVI OF TRUNK: Status: ACTIVE | Noted: 2020-07-15

## 2020-07-15 PROCEDURE — 11102 TANGNTL BX SKIN SINGLE LES: CPT

## 2020-07-15 PROCEDURE — ? EXTRACTIONS

## 2020-07-15 PROCEDURE — ? BIOPSY BY SHAVE METHOD

## 2020-07-15 PROCEDURE — 99214 OFFICE O/P EST MOD 30 MIN: CPT | Mod: 25

## 2020-07-15 PROCEDURE — ? COUNSELING

## 2020-07-15 ASSESSMENT — LOCATION ZONE DERM
LOCATION ZONE: NECK
LOCATION ZONE: TRUNK
LOCATION ZONE: FACE
LOCATION ZONE: ARM
LOCATION ZONE: ARM
LOCATION ZONE: TRUNK

## 2020-07-15 ASSESSMENT — LOCATION SIMPLE DESCRIPTION DERM
LOCATION SIMPLE: RIGHT UPPER BACK
LOCATION SIMPLE: RIGHT FOREARM
LOCATION SIMPLE: ABDOMEN
LOCATION SIMPLE: CHEST
LOCATION SIMPLE: LEFT ANTERIOR NECK
LOCATION SIMPLE: UPPER BACK
LOCATION SIMPLE: LEFT FOREARM
LOCATION SIMPLE: LEFT FOREHEAD
LOCATION SIMPLE: RIGHT FOREARM
LOCATION SIMPLE: CHEST
LOCATION SIMPLE: UPPER BACK
LOCATION SIMPLE: LEFT CHEEK

## 2020-07-15 ASSESSMENT — LOCATION DETAILED DESCRIPTION DERM
LOCATION DETAILED: STERNAL NOTCH
LOCATION DETAILED: LEFT SUPERIOR CENTRAL MALAR CHEEK
LOCATION DETAILED: LEFT LATERAL ABDOMEN
LOCATION DETAILED: LEFT VENTRAL PROXIMAL FOREARM
LOCATION DETAILED: SUPERIOR THORACIC SPINE
LOCATION DETAILED: RIGHT VENTRAL PROXIMAL FOREARM
LOCATION DETAILED: LEFT MEDIAL SUPERIOR CHEST
LOCATION DETAILED: RIGHT MEDIAL UPPER BACK
LOCATION DETAILED: LEFT INFERIOR MEDIAL FOREHEAD
LOCATION DETAILED: LEFT MEDIAL SUPERIOR CHEST
LOCATION DETAILED: RIGHT VENTRAL PROXIMAL FOREARM
LOCATION DETAILED: RIGHT INFERIOR UPPER BACK
LOCATION DETAILED: SUPERIOR THORACIC SPINE
LOCATION DETAILED: LEFT INFERIOR LATERAL NECK
LOCATION DETAILED: RIGHT LATERAL UPPER BACK

## 2020-07-15 NOTE — PROCEDURE: BIOPSY BY SHAVE METHOD
Detail Level: Detailed
Depth Of Biopsy: dermis
Was A Bandage Applied: Yes
Size Of Lesion In Cm: 0.8
X Size Of Lesion In Cm: 0
Biopsy Type: H and E
Biopsy Method: Personna blade
Anesthesia Type: 1% lidocaine with 1:100,000 epinephrine and a 1:10 solution of 8.4% sodium bicarbonate
Anesthesia Volume In Cc: 1
Hemostasis: Drysol
Wound Care: Petrolatum
Dressing: pressure dressing with telfa
Destruction After The Procedure: No
Type Of Destruction Used: Curettage
Curettage Text: The wound bed was treated with curettage after the biopsy was performed.
Cryotherapy Text: The wound bed was treated with cryotherapy after the biopsy was performed.
Electrodesiccation Text: The wound bed was treated with electrodesiccation after the biopsy was performed.
Electrodesiccation And Curettage Text: The wound bed was treated with electrodesiccation and curettage after the biopsy was performed.
Silver Nitrate Text: The wound bed was treated with silver nitrate after the biopsy was performed.
Lab: [id number]
Lab Facility: 
Consent: Written consent was obtained and risks were reviewed including but not limited to scarring, infection, bleeding, scabbing, incomplete removal, nerve damage and allergy to anesthesia.
Post-Care Instructions: I reviewed with the patient in detail post-care instructions. Patient is to keep the biopsy site dry overnight. Gentle cleansing daily.  Apply petroleum ointment daily until healed. Patient may apply hydrogen peroxide soaks to remove any crusting.
Notification Instructions: Patient will be notified of biopsy results. However, patient instructed to call the office if not contacted within 2 weeks.
Billing Type: Third-Party Bill
Information: Selecting Yes will display possible errors in your note based on the variables you have selected. This validation is only offered as a suggestion for you. PLEASE NOTE THAT THE VALIDATION TEXT WILL BE REMOVED WHEN YOU FINALIZE YOUR NOTE. IF YOU WANT TO FAX A PRELIMINARY NOTE YOU WILL NEED TO TOGGLE THIS TO 'NO' IF YOU DO NOT WANT IT IN YOUR FAXED NOTE.

## 2020-07-15 NOTE — PROCEDURE: COUNSELING
Multidisciplinary Problems (Active)           Template: Adult Mental Health         Problem: Adult Mental Health   Dates: Start: 01/04/17      Disciplines: Interdisciplinary   Goal: Reports or exhibits improvement in depressive mood signs/symptoms    Dates: Start: 01/04/17      Disciplines: Interdisciplinary   Outcomes:       Goal: Able to engage in reality-based communication and refrains from acting on delusional thoughts    Dates: Start: 01/04/17      Disciplines: Interdisciplinary   Outcomes:       Goal: Reports a decrease in thoughts of suicide and/or violence    Dates: Start: 01/04/17      Disciplines: Interdisciplinary   Outcomes:       Goal: Denies having suicidal or homicidal plans    Dates: Start: 01/04/17      Disciplines: Interdisciplinary   Outcomes:       Intervention: Implement and maintain protective environment    Dates: Start: 01/04/17      Disciplines: Interdisciplinary      Intervention: Remove all objects from patient's environment which are identified as dangerous    Dates: Start: 01/04/17      Disciplines: Interdisciplinary      Intervention: Facilitate reality based conversation and orientation through use of therapeutic communication, feedback    Dates: Start: 01/04/17      Disciplines: Interdisciplinary                 Multidisciplinary Problems (Resolved)            There are no resolved problems.        
Detail Level: Detailed
Detail Level: Zone
Detail Level: Simple

## 2020-07-15 NOTE — PROCEDURE: EXTRACTIONS
Consent was obtained and risks were reviewed including but not limited to scarring, infection, bleeding, scabbing, incomplete removal, and allergy to anesthesia.
Render Post-Care Instructions In Note?: no
Post-Care Instructions: I reviewed with the patient in detail post-care instructions. Patient is to keep the treatment areas dry overnight, and then apply bacitracin twice daily until healed. Patient may apply hydrogen peroxide soaks to remove any crusting.
Extraction Method: comedo extractor
Detail Level: Detailed
Acne Type: Comedonal Lesions
Prep Text (Optional): Prior to removal the treatment areas were prepped in the usual fashion. No charge for extraction

## 2020-07-16 ENCOUNTER — HOSPITAL ENCOUNTER (OUTPATIENT)
Dept: RADIOLOGY | Facility: MEDICAL CENTER | Age: 60
End: 2020-07-16
Attending: OBSTETRICS & GYNECOLOGY
Payer: COMMERCIAL

## 2020-07-16 DIAGNOSIS — Z12.31 VISIT FOR SCREENING MAMMOGRAM: ICD-10-CM

## 2020-07-16 PROCEDURE — 77067 SCR MAMMO BI INCL CAD: CPT

## 2020-07-31 RX ORDER — PROPRANOLOL HYDROCHLORIDE 10 MG/1
TABLET ORAL
Qty: 60 TAB | Refills: 5 | Status: SHIPPED | OUTPATIENT
Start: 2020-07-31 | End: 2020-08-06

## 2020-07-31 NOTE — PROGRESS NOTES
Personally spoke with patient regarding further management.  We discussed risks and benefits of additional medication versus increasing propranolol.  Given her significant benefit from propranolol we will opt to increase it.  Her current blood pressure and heart rate seem to be doing okay.  She is to monitor her blood pressure and monitor for side effects.  The risk/benefits of side effects and alternatives were personally discussed with the patient.  She communicated understanding.  She will follow-up in October.

## 2020-08-06 RX ORDER — VENLAFAXINE HYDROCHLORIDE 37.5 MG/1
37.5 CAPSULE, EXTENDED RELEASE ORAL DAILY
Qty: 30 CAP | Refills: 5 | Status: SHIPPED | OUTPATIENT
Start: 2020-08-06 | End: 2020-10-20 | Stop reason: SDUPTHER

## 2020-09-24 DIAGNOSIS — G44.81 HYPNIC HEADACHE: ICD-10-CM

## 2020-09-24 RX ORDER — ATORVASTATIN CALCIUM 10 MG/1
TABLET, FILM COATED ORAL
Qty: 30 TAB | Refills: 6 | Status: SHIPPED | OUTPATIENT
Start: 2020-09-24 | End: 2020-11-24

## 2020-09-24 RX ORDER — INDOMETHACIN 75 MG/1
CAPSULE, EXTENDED RELEASE ORAL
Qty: 30 CAP | Refills: 6 | Status: SHIPPED | OUTPATIENT
Start: 2020-09-24 | End: 2021-04-13

## 2020-10-20 ENCOUNTER — OFFICE VISIT (OUTPATIENT)
Dept: NEUROLOGY | Facility: MEDICAL CENTER | Age: 60
End: 2020-10-20
Payer: COMMERCIAL

## 2020-10-20 VITALS
TEMPERATURE: 98.5 F | SYSTOLIC BLOOD PRESSURE: 108 MMHG | WEIGHT: 146 LBS | HEART RATE: 65 BPM | HEIGHT: 66 IN | BODY MASS INDEX: 23.46 KG/M2 | RESPIRATION RATE: 16 BRPM | OXYGEN SATURATION: 96 % | DIASTOLIC BLOOD PRESSURE: 65 MMHG

## 2020-10-20 DIAGNOSIS — G43.009 MIGRAINE WITHOUT AURA AND WITHOUT STATUS MIGRAINOSUS, NOT INTRACTABLE: Chronic | ICD-10-CM

## 2020-10-20 DIAGNOSIS — G44.81 HYPNIC HEADACHE: ICD-10-CM

## 2020-10-20 PROCEDURE — 99214 OFFICE O/P EST MOD 30 MIN: CPT | Performed by: PSYCHIATRY & NEUROLOGY

## 2020-10-20 RX ORDER — SUMATRIPTAN 100 MG/1
100 TABLET, FILM COATED ORAL
Qty: 10 TAB | Refills: 3 | Status: SHIPPED | OUTPATIENT
Start: 2020-10-20 | End: 2021-03-12

## 2020-10-20 RX ORDER — VENLAFAXINE HYDROCHLORIDE 37.5 MG/1
75 CAPSULE, EXTENDED RELEASE ORAL DAILY
Qty: 60 CAP | Refills: 5 | Status: SHIPPED | OUTPATIENT
Start: 2020-10-20 | End: 2021-03-12 | Stop reason: SDUPTHER

## 2020-10-20 ASSESSMENT — ENCOUNTER SYMPTOMS
FEVER: 0
SHORTNESS OF BREATH: 0
HALLUCINATIONS: 0
FALLS: 0
WEIGHT LOSS: 0

## 2020-10-20 NOTE — PROGRESS NOTES
Chief Complaint   Patient presents with   • Follow-Up     Hypnic headache     Patient is referred by Jeanine Dorado for initial consult.    History of present illness:  Maryann Ashley 59 y.o. female presents today for headaches.   History is obtained from patient.  and Patient is accompanied by self.  Her  is also my patient.    Duration/timing: onset earlier adult life with worsening later on in life about 2009; 20/30 headache days a month; duration > 4 hours  Context: Migraines  Location: left temporal  Quality: dull pounding  Severity: severe  Modifying factors: worse with aspartane; improved with stopping aspartane   Associated signs/symptoms: light sensitivity  Denies: bladder incontinence, bowel incontinence, dizziness, vision changes/loss or diplopia, head trauma , weakness, numbness/tingling, swallowing difficulties, speech disturbance, incoordination, depression, anxiety, hearing loss, loss of consciousness, hallucinations, abnormal movements and falls     Duration/timing: onset 2009, occurs at 2:58 in the morning  Context: Hypnic headaches  Location: left temporal  Quality: sharp  Severity: moderate to severe  Modifying factors: see below  Associated signs/symptoms: none  Denies: As above    Patient has tried:  -Indomethacin - 75mg nightly taken with cup of coffee, initiated 2015, refilled by PCP  -Caffeine pills - caused more headaches  -Propranolol - 10mg BID, started years ago - did not tolerate increased due to low blood pressure and worsening headache  -Imitrex - with good abortive effect takes 50mg (1/2 100mg) - 90% of the time  -Topiramate -caused abnormal mood  -Venlafaxine 37.5 mg daily -increase to 75 mg daily October 2020    Subjective: Patient was last seen in neurology clinic on July 2020.     Headaches are very slightly improved with the venlafaxine.  She can have a couple of days of headache free period.  She needs more sumatriptan.  Headaches continue to be under control  but the migraines are now almost daily up to 20 days a month.  Otherwise headache characteristics are unchanged for what is mentioned above.        Past medical history:   Past Medical History:   Diagnosis Date   • Fuchs' corneal dystrophy 2018   • Giant cell tumor of bone 2006    removal from L distal femur   • Hypertension    • Hypnic headache    • Migraine    • Thyroid nodule         Past surgical history:   Past Surgical History:   Procedure Laterality Date   • OTHER      giant cell tumor removed rom Left distal femur   • FUSION      cervical fusion C4-6   • ABDOMINAL HYSTERECTOMY TOTAL  1985    partial , for fibroids   • OTHER ORTHOPEDIC SURGERY      right wrist/base of thumb surgery for arthritis       Family history:   Family History   Problem Relation Age of Onset   • Breast Cancer Mother 30         at 63 yr   • Cancer Father         metastatic; Lung   • Heart Disease Father    • Hypertension Father    • Stroke Father    • Cancer Sister         breast   • Diabetes Sister    • Diabetes Maternal Grandmother        Social history:   Tobacco Use   • Smoking status: Never Smoker   • Smokeless tobacco: Never Used   Substance and Sexual Activity   • Alcohol use: Yes     Frequency: Monthly or less     Comment: 1 per month   • Drug use: No   • Sexual activity: Yes     Partners: Male     Comment: , homemaker     Current medications:   Current Outpatient Medications   Medication   • sumatriptan (IMITREX) 100 MG tablet   • venlafaxine XR (EFFEXOR XR) 37.5 MG CAPSULE SR 24 HR   • atorvastatin (LIPITOR) 10 MG Tab   • indomethacin SR (INDOCIN SR) 75 MG Cap CR   • propranolol (INDERAL) 40 MG Tab   • alendronate (FOSAMAX) 70 MG Tab   • cyclobenzaprine (FLEXERIL) 10 MG Tab     No current facility-administered medications for this visit.        Medication Allergy:  No Known Allergies    Review of Systems   Constitutional: Negative for fever and weight loss.   Respiratory: Negative for shortness of  "breath.    Cardiovascular: Negative for chest pain.   Musculoskeletal: Negative for falls.   Neurological:        As per HPI   Psychiatric/Behavioral: Negative for hallucinations.       Physical examination:   Vitals:    10/20/20 1451   BP: 108/65   BP Location: Left arm   Patient Position: Sitting   BP Cuff Size: Adult   Pulse: 65   Resp: 16   Temp: 36.9 °C (98.5 °F)   TempSrc: Temporal   SpO2: 96%   Weight: 66.2 kg (146 lb)   Height: 1.676 m (5' 6\")     General: Patient in well nourished in no apparent distress.  Eyes: Ophthalmoscopic examination performed but discs cannot be visualized well enough to characterize bilaterally. Prior exam  HENT: Normocephalic, atraumatic. Mallapatic score 2 Prior exam  Cardiovascular: No lower extremity edema.  Respiratory: Normal respiratory effort.   Skin: No appreciable signs of acute rashes or bruising.   Musculoskeletal: No signs of joint or muscle swelling.   Psychiatric: Pleasant.     NEUROLOGICAL EXAM:   Mental status: Awake, alert and fully oriented to person, place, time and situation. Normal attention, concentration and fund of knowledge for education level.   Speech and language: Speech is fluent without errors and clear.  Cranial nerve exam:  II: PERRL. Visual fields are intact by confrontation. Prior exam  III, IV, VI: EOMI, no diplopia, no ptosis.Prior exam  V: Sensation to light touch is normal over V1-3 distributions bilaterally.   Prior exam  VII: Facial movements are symmetrical. There is no facial droop.  Prior exam  VIII: Hearing intact to soft speech   IX:  Dysarthria is not present.  XI: Shoulder shrug are symmetrical and strong. Prior exam  XII: Tongue protrudes midline. Prior exam    Motor exam: Prior exam as documented below, today she is moving all extremities equally  Muscle tone is normal in all 4 limbs. and No abnormal movements appreciated.  Rapid finger tapping is equal bilaterally.    Muscle " strength:     Right  Left  Deltoid   5/5  5/5      Biceps   5/5  5/5  Triceps  5/5  5/5   Wrist extensors 5/5  5/5  Wrist flexors  5/5  5/5     5/5  5/5  Interossei  5/5  5/5  Thenar (APB)  NT/5  NT/5   Hip flexors  5/5  5/5  Quadriceps  5-/5  5-/5    Hamstrings  5-/5  5-/5  Dorsiflexors  5/5  5/5  Plantarflexors  5/5  5/5  Toe extension  NT/5  NT/5  NT = not tested    Sensory exam: Prior exam  Intact to Light touch in bilateral upper and lower extremity.    Reflexes:  Prior exam     Right  Left  Biceps   0/4  0/4  Triceps  0/4  0/4  Brachioradialis 0/4  0/4  Knee jerk  1/4  1/4  Ankle jerk  1/4  1/4   bilateral toes are mute to plantar stimulation..      Coordination: shows a normal finger-nose-finger and heel to shin bilaterally.  Prior exam  Gait: Casual gait is normal.      ANCILLARY DATA REVIEWED:   Lab Data Review:  Lab Results   Component Value Date/Time    HBA1C 5.4 07/16/2018      Imaging: None  Records reviewed: None      ASSESSMENT AND PLAN:    1. Hypnic headache: Prior diagnosis based on clinical history.  Patient has been chronically treated by primary care with daily indomethacin and a cup of coffee with good response.  Primary care does monitor her renal function intermittently.  Otherwise well controlled.  MRI brain with without contrast in 2009 at the onset of hypnic headache was unremarkable for secondary actual cause.  -Previously discussed other treatment options including but not limited to caffeine pills, daily caffeine amount recommended for treatment of hypnic headaches, other preventative options including but not limited to for topiramate and lamotrigine, risk/benefits of side effects and alternatives to those medications  -Agree with monitoring of renal function in the interim, will defer to primary care  -Patient on indomethacin 75 mg daily by PCP  -Consider Zonegran for further preventative treatment treatment    2. Migraine without aura and without status migrainosus, intractable,  worsening: Chronic history of.  Based on clinical history.  Patient has had continued worsening of her headaches now up to 20 headache days per month.  She is felt to first-line oral preventatives.  There are no red flag symptoms or focal exam findings to suggest secondary etiology.  Her MRI brain without contrast in 2009 also unremarkable for secondary structural cause.  Would benefit from further adjustments of preventative therapy  -Propranolol 40 mg twice daily for headache prevention  -Increase venlafaxine to 75 mg daily  -Patient to stop trazodone due to concern for medication interaction  -Imitrex as needed for abortive therapy  -Botox for chronic migraine 155 units per PREEMPT protocol -schedule, if no improvement patient will proceed with Botox  -Personally discussed other treatment options including but not limited to CGRP antagonists which she will also consider    FOLLOW-UP: Return for for botox.    EDUCATION AND COUNSELING:  -I personally discussed the following with the patient:   Risks/benefits/side effects/alternatives of medication including but not limited to drowsiness, sedation, dizziness, increased risk for falls, hypersensitivity reactions including rash (which may be fatal), weight changes, GI side effects (gastritis, ulcers, bleeding, changes in appetite, pancreatitis, change in bowel habits), liver dysfunction, increased risk for bleeding, increased risk for depression, anxiety, suicide, psychosis and mood changes and avoid abrupt cessation of medication., Risks/benefits/side effects/alternatives to treatment of chronic intractable migraine including but not limited to side effects to Botox (bruising and injection site reactions, facial asymmetry, trouble swallowing, head drop, systemic side effects such as shortness of breath which can be life-threatening) and Risk of medication interactions    The patient communicates understanding of the above and agrees that due to the complexity of  his/her diagnosis, results of any testing and further recommendations will typically be discussed/made during a face to face encounter in my office. The patient and/or family further understands it is their responsibility to keep proper follow up.     Disclaimer  This dictation was created using voice recognition software. I have made every reasonable attempt to avoid dictation errors, but this document may contain an error not identified before finalizing. If the error changes the accuracy of the document, I would appreciate it being brought to my attention. Thank you very much.     Priti De Guzman MD  Neurology  [facility]

## 2020-10-21 DIAGNOSIS — G43.709 CHRONIC MIGRAINE W/O AURA W/O STATUS MIGRAINOSUS, NOT INTRACTABLE: ICD-10-CM

## 2020-10-28 ENCOUNTER — OFFICE VISIT (OUTPATIENT)
Dept: MEDICAL GROUP | Facility: MEDICAL CENTER | Age: 60
End: 2020-10-28
Payer: COMMERCIAL

## 2020-10-28 VITALS
HEART RATE: 61 BPM | SYSTOLIC BLOOD PRESSURE: 114 MMHG | OXYGEN SATURATION: 98 % | WEIGHT: 148.37 LBS | DIASTOLIC BLOOD PRESSURE: 76 MMHG | BODY MASS INDEX: 23.84 KG/M2 | TEMPERATURE: 97.2 F | HEIGHT: 66 IN

## 2020-10-28 DIAGNOSIS — E55.9 VITAMIN D DEFICIENCY: ICD-10-CM

## 2020-10-28 DIAGNOSIS — G44.81 HYPNIC HEADACHE: ICD-10-CM

## 2020-10-28 DIAGNOSIS — D48.9 NEOPLASM OF UNCERTAIN BEHAVIOR: ICD-10-CM

## 2020-10-28 DIAGNOSIS — M81.0 OSTEOPOROSIS, UNSPECIFIED OSTEOPOROSIS TYPE, UNSPECIFIED PATHOLOGICAL FRACTURE PRESENCE: ICD-10-CM

## 2020-10-28 DIAGNOSIS — I10 ESSENTIAL HYPERTENSION: Chronic | ICD-10-CM

## 2020-10-28 DIAGNOSIS — E78.5 DYSLIPIDEMIA: Chronic | ICD-10-CM

## 2020-10-28 DIAGNOSIS — G43.009 MIGRAINE WITHOUT AURA AND WITHOUT STATUS MIGRAINOSUS, NOT INTRACTABLE: Chronic | ICD-10-CM

## 2020-10-28 PROCEDURE — 99214 OFFICE O/P EST MOD 30 MIN: CPT | Performed by: NURSE PRACTITIONER

## 2020-10-28 RX ORDER — PROPRANOLOL HYDROCHLORIDE 40 MG/1
40 TABLET ORAL 2 TIMES DAILY
Qty: 60 TAB | Refills: 3 | Status: SHIPPED | OUTPATIENT
Start: 2020-10-28 | End: 2021-03-15

## 2020-10-28 RX ORDER — NITROFURANTOIN 25; 75 MG/1; MG/1
CAPSULE ORAL
COMMUNITY
End: 2021-03-10

## 2020-10-28 RX ORDER — ALENDRONATE SODIUM 70 MG/1
70 TABLET ORAL
Qty: 4 TAB | Refills: 3 | Status: SHIPPED | OUTPATIENT
Start: 2020-10-28

## 2020-10-28 NOTE — LETTER
[facility]  AUSTYN RaviP.R.N.  [address]  [place] [address]  Fax: [phone]   Authorization for Release/Disclosure of   Protected Health Information   Name: MARYANN SOUTH : 1960 SSN: [id number]   Address: [address] [place] [address] [facility] [address] Phone:    There are no phone numbers on file.   I authorize the entity listed below to release/disclose the PHI below to:   [facility]/Jeanine Dorado A.P.R.N. and AUSTYN RaviP.R.N.   Provider or Entity Name:  [facility], [facility], [facility]   Phone:      Fax:     Reason for request: continuity of care   Information to be released:    [  ] LAST COLONOSCOPY,  including any PATH REPORT and follow-up  [  ] LAST FIT/COLOGUARD RESULT [ x ] LAST DEXA  [  ] LAST MAMMOGRAM  [  ] LAST PAP  [  ] LAST LABS [  ] RETINA EXAM REPORT  [  ] IMMUNIZATION RECORDS  [  ] Release all info      [  ] Check here and initial the line next to each item to release ALL health information INCLUDING  _____ Care and treatment for drug and / or alcohol abuse  _____ HIV testing, infection status, or AIDS  _____ Genetic Testing    DATES OF SERVICE OR TIME PERIOD TO BE DISCLOSED: _____________  I understand and acknowledge that:  * This Authorization may be revoked at any time by you in writing, except if your health information has already been used or disclosed.  * Your health information that will be used or disclosed as a result of you signing this authorization could be re-disclosed by the recipient. If this occurs, your re-disclosed health information may no longer be protected by State or Federal laws.  * You may refuse to sign this Authorization. Your refusal will not affect your ability to obtain treatment.  * This Authorization becomes effective upon signing and will  on (date) __________.      If no date is indicated, this Authorization will  one (1) year from the signature date.    Name: Maryann Ramirez  Dion    Signature:   Date:     10/28/2020       PLEASE FAX REQUESTED RECORDS BACK TO: [phone]

## 2020-10-28 NOTE — PROGRESS NOTES
"Chief Complaint   Patient presents with   • Referral Needed     [facility] [place] [facility] renewal   • Orders Needed     Lab Work       Subjective:     HPI:     Maryann Ashley is a 59 y.o. female here to discuss the evaluation and management of:      1. Neoplasm of uncertain behavior  Needs updated referral to Dermatology. Has a spot on her left lateral neck that just developed recently. No pain, no itching. Smaller than an eraser head, flesh colored.     2. Migraine without aura and without status migrainosus, not intractable  3. Hypnic headache  Following up with Neurology for this. She states she was wondering if \"changes in elevation\" have an influence on the frequency of headaches. Mentions recent travel to [place] and did not experience any headaches. She also mentions that she has not had headaches prior to moving to this area. Started on Effexor recently for this.     4. Dyslipidemia  Taking Atorvastatin for this. No myalgias.    5. Essential hypertension  Taking propranolol for this.    6. Osteoporosis, unspecified osteoporosis type, unspecified pathological fracture presence  Taking fosamax for this.     7. Vitamin D deficiency  Taking OTC for this.           ROS:  Denies any Headache, Blurred Vision, Confusion, Chest pain,  Shortness of breath,  Abdominal pain, Changes of bowel or bladder, Lower ext edema, Fevers, Nights sweats, Weight Changes, Focal weakness or numbness.  And all other systems reviewed and are all negative. POSITIVE FOR skin lesion, headache        Current Outpatient Medications:   •  propranolol (INDERAL) 40 MG Tab, Take 1 Tab by mouth 2 times a day., Disp: 60 Tab, Rfl: 3  •  alendronate (FOSAMAX) 70 MG Tab, Take 1 Tab by mouth every 7 days., Disp: 4 Tab, Rfl: 3  •  sumatriptan (IMITREX) 100 MG tablet, Take 1 Tab by mouth Once PRN for Migraine for up to 1 dose., Disp: 10 Tab, Rfl: 3  •  venlafaxine XR (EFFEXOR XR) 37.5 MG CAPSULE SR 24 HR, Take 2 Caps by mouth every " day., Disp: 60 Cap, Rfl: 5  •  atorvastatin (LIPITOR) 10 MG Tab, TAKE 1 TABLET BY MOUTH EVERY DAY AT BEDTIME, Disp: 30 Tab, Rfl: 6  •  indomethacin SR (INDOCIN SR) 75 MG Cap CR, Take 1 capsule by mouth once daily, Disp: 30 Cap, Rfl: 6  •  cyclobenzaprine (FLEXERIL) 10 MG Tab, Take 1 Tab by mouth 3 times a day as needed., Disp: 30 Tab, Rfl: 3  •  nitrofurantoin (MACROBID) 100 MG Cap, nitrofurantoin monohydrate/macrocrystals 100 mg capsule, Disp: , Rfl:     No Known Allergies    Past Medical History:   Diagnosis Date   • Fuchs' corneal dystrophy 2018   • Giant cell tumor of bone 2006    removal from L distal femur   • Hypertension    • Hypnic headache    • Migraine    • Thyroid nodule      Past Surgical History:   Procedure Laterality Date   • OTHER      giant cell tumor removed rom Left distal femur   • FUSION      cervical fusion C4-6   • ABDOMINAL HYSTERECTOMY TOTAL  1985    partial , for fibroids   • OTHER ORTHOPEDIC SURGERY      right wrist/base of thumb surgery for arthritis     Family History   Problem Relation Age of Onset   • Breast Cancer Mother 30         at 63 yr   • Cancer Father         metastatic; Lung   • Heart Disease Father    • Hypertension Father    • Stroke Father    • Cancer Sister         breast   • Diabetes Sister    • Diabetes Maternal Grandmother      Social History     Socioeconomic History   • Marital status:      Spouse name: Not on file   • Number of children: 2   • Years of education: Ass college   • Highest education level: Not on file   Occupational History     Comment: Retired Computer work   Social Needs   • Financial resource strain: Not on file   • Food insecurity     Worry: Not on file     Inability: Not on file   • Transportation needs     Medical: Not on file     Non-medical: Not on file   Tobacco Use   • Smoking status: Never Smoker   • Smokeless tobacco: Never Used   Substance and Sexual Activity   • Alcohol use: Yes     Frequency: Monthly or less      "Comment: 1 per month   • Drug use: No   • Sexual activity: Yes     Partners: Male     Comment: , homemaker   Lifestyle   • Physical activity     Days per week: Not on file     Minutes per session: Not on file   • Stress: Not on file   Relationships   • Social connections     Talks on phone: Not on file     Gets together: Not on file     Attends Tenriism service: Not on file     Active member of club or organization: Not on file     Attends meetings of clubs or organizations: Not on file     Relationship status: Not on file   • Intimate partner violence     Fear of current or ex partner: Not on file     Emotionally abused: Not on file     Physically abused: Not on file     Forced sexual activity: Not on file   Other Topics Concern   •  Service Not Asked   • Blood Transfusions Not Asked   • Caffeine Concern Not Asked   • Occupational Exposure Not Asked   • Hobby Hazards Not Asked   • Sleep Concern Not Asked   • Stress Concern Not Asked   • Weight Concern Not Asked   • Special Diet Not Asked   • Back Care Not Asked   • Exercise Yes   • Bike Helmet Not Asked   • Seat Belt Yes   • Self-Exams Not Asked   Social History Narrative    Lives with        Objective:     Vitals: /76 (BP Location: Right arm, Patient Position: Sitting, BP Cuff Size: Adult)   Pulse 61   Temp 36.2 °C (97.2 °F) (Temporal)   Ht 1.676 m (5' 6\")   Wt 67.3 kg (148 lb 5.9 oz)   SpO2 98%   BMI 23.95 kg/m²    General: Alert, pleasant, NAD  HEENT: Normocephalic.   Skin: Warm, dry, no rashes. Small round flesh colored papule about 0.5cm in diameter on left lateral neck.   Extremities: No leg edema. No discoloration  Neurological: No tremors  Psych:  Affect/mood is normal, judgement is good, memory is intact, grooming is appropriate.    Assessment/Plan:     Maryann was seen today for referral needed and orders needed.    Diagnoses and all orders for this visit:    Neoplasm of uncertain behavior  -     REFERRAL TO " "DERMATOLOGY    Migraine without aura and without status migrainosus, not intractable  Hypnic headache  Chronic. Now following with Neurology. Recently started on Effexor for this. Continue to follow with Neurology.  -     propranolol (INDERAL) 40 MG Tab; Take 1 Tab by mouth 2 times a day.    Dyslipidemia  Due for recheck of labs.  -     Lipid Profile; Future    Essential hypertension  Stable. Due for re-check of labs.  -     CBC WITHOUT DIFFERENTIAL; Future  -     Comp Metabolic Panel; Future  -     TSH WITH REFLEX TO FT4; Future  -     MICROALBUMIN CREAT RATIO URINE; Future    Osteoporosis, unspecified osteoporosis type, unspecified pathological fracture presence  Started on Fosamax 6/2019. Requesting Dexa scan from \"[place]   -     VITAMIN D,25 HYDROXY; Future  -     alendronate (FOSAMAX) 70 MG Tab; Take 1 Tab by mouth every 7 days.    Vitamin D deficiency  -     VITAMIN D,25 HYDROXY; Future          No follow-ups on file.          Jeanine ABRAHAM.  "

## 2020-11-11 ENCOUNTER — APPOINTMENT (RX ONLY)
Dept: URBAN - METROPOLITAN AREA CLINIC 22 | Facility: CLINIC | Age: 60
Setting detail: DERMATOLOGY
End: 2020-11-11

## 2020-11-11 DIAGNOSIS — L82.0 INFLAMED SEBORRHEIC KERATOSIS: ICD-10-CM

## 2020-11-11 DIAGNOSIS — Z85.828 PERSONAL HISTORY OF OTHER MALIGNANT NEOPLASM OF SKIN: ICD-10-CM

## 2020-11-11 DIAGNOSIS — Z71.89 OTHER SPECIFIED COUNSELING: ICD-10-CM

## 2020-11-11 DIAGNOSIS — F42.4 EXCORIATION (SKIN-PICKING) DISORDER: ICD-10-CM

## 2020-11-11 PROBLEM — S00.30XA UNSPECIFIED SUPERFICIAL INJURY OF NOSE, INITIAL ENCOUNTER: Status: ACTIVE | Noted: 2020-11-11

## 2020-11-11 PROCEDURE — ? ADDITIONAL NOTES

## 2020-11-11 PROCEDURE — ? LIQUID NITROGEN

## 2020-11-11 PROCEDURE — 99213 OFFICE O/P EST LOW 20 MIN: CPT | Mod: 25

## 2020-11-11 PROCEDURE — ? COUNSELING

## 2020-11-11 PROCEDURE — 17110 DESTRUCTION B9 LES UP TO 14: CPT

## 2020-11-11 ASSESSMENT — LOCATION DETAILED DESCRIPTION DERM
LOCATION DETAILED: NASAL DORSUM
LOCATION DETAILED: LEFT INFERIOR LATERAL NECK
LOCATION DETAILED: LEFT MEDIAL SUPERIOR CHEST

## 2020-11-11 ASSESSMENT — LOCATION SIMPLE DESCRIPTION DERM
LOCATION SIMPLE: NOSE
LOCATION SIMPLE: CHEST
LOCATION SIMPLE: LEFT ANTERIOR NECK

## 2020-11-11 ASSESSMENT — LOCATION ZONE DERM
LOCATION ZONE: NECK
LOCATION ZONE: NOSE
LOCATION ZONE: TRUNK

## 2020-11-11 NOTE — PROCEDURE: ADDITIONAL NOTES
Additional Notes: Patient given [facility] brochure. ISK treated today with LN2
Detail Level: Generalized
Additional Notes: Patient thinks she scratched her nose.  I recommend she follow up in a month to recheck if it has not healed.

## 2020-11-11 NOTE — PROCEDURE: LIQUID NITROGEN
Duration Of Freeze Thaw-Cycle (Seconds): 3
Add 52 Modifier (Optional): no
Medical Necessity Clause: This procedure was medically necessary because the lesions that were treated were:
Consent: The patient's consent was obtained including but not limited to risks of crusting, scabbing, blistering, scarring, darker or lighter pigmentary change, recurrence, incomplete removal and infection.
Number Of Freeze-Thaw Cycles: 3 freeze-thaw cycles
Post-Care Instructions: I reviewed with the patient in detail post-care instructions. Patient is to wear sunprotection, and avoid picking at any of the treated lesions. Pt may apply Vaseline to crusted or scabbing areas.
Render Post-Care Instructions In Note?: yes
Medical Necessity Information: It is in your best interest to select a reason for this procedure from the list below. All of these items fulfill various CMS LCD requirements except the new and changing color options.
Detail Level: Detailed

## 2020-11-24 RX ORDER — ATORVASTATIN CALCIUM 20 MG/1
20 TABLET, FILM COATED ORAL
Qty: 90 TAB | Refills: 3 | Status: SHIPPED | OUTPATIENT
Start: 2020-11-24 | End: 2021-11-19

## 2020-12-21 RX ORDER — CYCLOBENZAPRINE HCL 10 MG
TABLET ORAL
Qty: 30 TAB | Refills: 0 | Status: SHIPPED | OUTPATIENT
Start: 2020-12-21 | End: 2021-04-26

## 2020-12-22 DIAGNOSIS — M25.569 KNEE PAIN, UNSPECIFIED CHRONICITY, UNSPECIFIED LATERALITY: ICD-10-CM

## 2020-12-22 DIAGNOSIS — M17.9 OSTEOARTHRITIS OF KNEE, UNSPECIFIED LATERALITY, UNSPECIFIED OSTEOARTHRITIS TYPE: ICD-10-CM

## 2021-02-03 DIAGNOSIS — M25.541 ARTHRALGIA OF BOTH HANDS: ICD-10-CM

## 2021-02-03 DIAGNOSIS — M25.542 ARTHRALGIA OF BOTH HANDS: ICD-10-CM

## 2021-03-08 ENCOUNTER — NURSE TRIAGE (OUTPATIENT)
Dept: HEALTH INFORMATION MANAGEMENT | Facility: OTHER | Age: 61
End: 2021-03-08

## 2021-03-09 NOTE — TELEPHONE ENCOUNTER
1. Caller Name: Maryann Ashley                   Call Back Number: [phone]    [facility] PCP or Specialty Provider: Yes Jeanine Dorado        2.  Has the patient previously tested positive for COVID-19? Yes Nov 2020         Was the patient hospitalized due to COVID-19 or are they being scheduled for PFT? No         Has it been at least 14 days since date of symptom onset or positive test? Yes    Disposition: Confirmed patient appointment. Advised patient to call [phone] if anything changes prior to scheduled appointment.    Coughing for a couple of weeks.  Relieved by OTC cough medication.  Denies fever.  Hx allergies.

## 2021-03-10 ENCOUNTER — OFFICE VISIT (OUTPATIENT)
Dept: MEDICAL GROUP | Facility: MEDICAL CENTER | Age: 61
End: 2021-03-10
Payer: COMMERCIAL

## 2021-03-10 VITALS
OXYGEN SATURATION: 95 % | HEART RATE: 66 BPM | WEIGHT: 153.2 LBS | BODY MASS INDEX: 24.73 KG/M2 | DIASTOLIC BLOOD PRESSURE: 78 MMHG | TEMPERATURE: 97.3 F | SYSTOLIC BLOOD PRESSURE: 104 MMHG

## 2021-03-10 DIAGNOSIS — D48.0: ICD-10-CM

## 2021-03-10 DIAGNOSIS — J31.0 RHINITIS, UNSPECIFIED TYPE: ICD-10-CM

## 2021-03-10 DIAGNOSIS — G47.00 INSOMNIA, UNSPECIFIED TYPE: ICD-10-CM

## 2021-03-10 DIAGNOSIS — G44.81 HYPNIC HEADACHE: ICD-10-CM

## 2021-03-10 PROCEDURE — 99213 OFFICE O/P EST LOW 20 MIN: CPT | Performed by: NURSE PRACTITIONER

## 2021-03-10 RX ORDER — DICLOFENAC SODIUM 20 MG/G
SOLUTION TOPICAL
COMMUNITY
End: 2021-04-27

## 2021-03-10 RX ORDER — DOXEPIN 3 MG/1
3 TABLET, FILM COATED ORAL NIGHTLY PRN
Qty: 30 TABLET | Refills: 2 | Status: SHIPPED | OUTPATIENT
Start: 2021-03-10 | End: 2021-03-17 | Stop reason: CLARIF

## 2021-03-10 RX ORDER — SUMATRIPTAN 100 MG/1
100 TABLET, FILM COATED ORAL
Qty: 10 TABLET | Refills: 6 | Status: SHIPPED | OUTPATIENT
Start: 2021-03-10

## 2021-03-10 ASSESSMENT — PATIENT HEALTH QUESTIONNAIRE - PHQ9: CLINICAL INTERPRETATION OF PHQ2 SCORE: 0

## 2021-03-10 NOTE — PROGRESS NOTES
"Chief Complaint   Patient presents with   • Seasonal Allergies   • Cough     Med Robitasin?       Subjective:     HPI:     Maryann Ashley is a 60 y.o. female here to discuss the evaluation and management of:    Patient presents today with complaints of having a intermittent cough.  It is not daily.  States every few days she will cough up some phlegm.  It is not purulent in nature.She does have a constant runny nose.  Has been taking over-the-counter's for this.  Has not completely resolved.  No fevers, chills, nausea or vomiting.    Hx of Giant cell Tumor of bone in 2006, removed from Left distal femur in [place]. Reports it was benign. Has pain in her knee states a topical CBD called \"Kynd\" is helpful for her pain.  She is also wondering if she needs an updated chest x-ray as she was informed after having her surgery that she would need yearly chest x-rays to monitor for any potential metastasis.    Has used trazodone in the past for sleep.  Interested in trialing a different medication.  Has tried multiple medications for sleep and have not been very successful.    ROS:  Denies any Headache, Blurred Vision, Confusion, Chest pain,  Shortness of breath,  Abdominal pain, Changes of bowel or bladder, Lower ext edema, Fevers, Nights sweats, Weight Changes, Focal weakness or numbness.  And all other systems reviewed and are all negative. POSITIVE FOR the above        Current Outpatient Medications:   •  ipratropium (ATROVENT) 0.06 % Solution, Administer 2 Sprays into affected nostril(S) 4 times a day., Disp: 15 mL, Rfl: 1  •  Diclofenac Sodium (PENNSAID) 2 % Solution, Pennsaid 20 mg/gram/actuation (2 %) topical soln in metered-dose pump, Disp: , Rfl:   •  sumatriptan (IMITREX) 100 MG tablet, Take 1 tablet by mouth one time as needed for Migraine for up to 1 dose., Disp: 10 tablet, Rfl: 6  •  cyclobenzaprine (FLEXERIL) 10 mg Tab, Take 1 tablet by mouth three times daily as needed, Disp: 30 Tab, Rfl: 0  •  atorvastatin " (LIPITOR) 20 MG Tab, Take 1 Tab by mouth every bedtime., Disp: 90 Tab, Rfl: 3  •  alendronate (FOSAMAX) 70 MG Tab, Take 1 Tab by mouth every 7 days., Disp: 4 Tab, Rfl: 3  •  indomethacin SR (INDOCIN SR) 75 MG Cap CR, Take 1 capsule by mouth once daily, Disp: 30 Cap, Rfl: 6  •  traZODone (DESYREL) 100 MG Tab, Take 1 tablet by mouth at bedtime as needed for Sleep., Disp: 90 tablet, Rfl: 3  •  propranolol (INDERAL) 40 MG Tab, Take 1 tablet by mouth twice daily, Disp: 60 tablet, Rfl: 6  •  venlafaxine XR (EFFEXOR XR) 37.5 MG CAPSULE SR 24 HR, Take 2 Capsules by mouth every day., Disp: 60 capsule, Rfl: 2    No Known Allergies    Past Medical History:   Diagnosis Date   • Fuchs' corneal dystrophy 2018   • Giant cell tumor of bone 2006    removal from L distal femur   • Hypertension    • Hypnic headache    • Migraine    • Thyroid nodule      Past Surgical History:   Procedure Laterality Date   • OTHER      giant cell tumor removed rom Left distal femur   • FUSION      cervical fusion C4-6   • ABDOMINAL HYSTERECTOMY TOTAL  1985    partial , for fibroids   • OTHER ORTHOPEDIC SURGERY      right wrist/base of thumb surgery for arthritis     Family History   Problem Relation Age of Onset   • Breast Cancer Mother 30         at 63 yr   • Cancer Father         metastatic; Lung   • Heart Disease Father    • Hypertension Father    • Stroke Father    • Cancer Sister         breast   • Diabetes Sister    • Diabetes Maternal Grandmother      Social History     Socioeconomic History   • Marital status:      Spouse name: Not on file   • Number of children: 2   • Years of education: Ass college   • Highest education level: Not on file   Occupational History     Comment: Retired Computer work   Tobacco Use   • Smoking status: Never Smoker   • Smokeless tobacco: Never Used   Substance and Sexual Activity   • Alcohol use: Yes     Comment: 1 per month   • Drug use: No   • Sexual activity: Yes     Partners: Male      Comment: , homemaker   Other Topics Concern   •  Service Not Asked   • Blood Transfusions Not Asked   • Caffeine Concern Not Asked   • Occupational Exposure Not Asked   • Hobby Hazards Not Asked   • Sleep Concern Not Asked   • Stress Concern Not Asked   • Weight Concern Not Asked   • Special Diet Not Asked   • Back Care Not Asked   • Exercise Yes   • Bike Helmet Not Asked   • Seat Belt Yes   • Self-Exams Not Asked   Social History Narrative    Lives with      Social Determinants of Health     Financial Resource Strain:    • Difficulty of Paying Living Expenses:    Food Insecurity:    • Worried About Running Out of Food in the Last Year:    • Ran Out of Food in the Last Year:    Transportation Needs:    • Lack of Transportation (Medical):    • Lack of Transportation (Non-Medical):    Physical Activity:    • Days of Exercise per Week:    • Minutes of Exercise per Session:    Stress:    • Feeling of Stress :    Social Connections:    • Frequency of Communication with Friends and Family:    • Frequency of Social Gatherings with Friends and Family:    • Attends [employer] Services:    • Active Member of Clubs or Organizations:    • Attends Club or Organization Meetings:    • Marital Status:    Intimate Partner Violence:    • Fear of Current or Ex-Partner:    • Emotionally Abused:    • Physically Abused:    • Sexually Abused:        Objective:     Vitals: /78 (BP Location: Right arm, Patient Position: Sitting, BP Cuff Size: Adult)   Pulse 66   Temp 36.3 °C (97.3 °F)   Wt 69.5 kg (153 lb 3.2 oz)   SpO2 95%   BMI 24.73 kg/m²    General: Alert, pleasant, NAD  HEENT: Normocephalic.    Respiratory: Normal respiratory effort.  Clear to auscultation bilaterally. No wheezing  Skin: Warm, dry, no rashes.  Extremities: No leg edema. No discoloration  Neurological: No tremors  Psych:  Affect/mood is normal, judgement is good, memory is intact, grooming is appropriate.    Assessment/Plan:     Maryann silver  seen today for seasonal allergies and cough.    Diagnoses and all orders for this visit:    Rhinitis, unspecified type  Chronic problem for the patient.  Multiple over-the-counter products have not been successful.  Trial of Atrovent.      ipratropium (ATROVENT) 0.06 % Solution; Administer 2 Sprays into affected nostril(S) 4 times a day.    Giant cell tumor of joint  History of treatment in 2006.  Recommend following up with orthopedics for further guidance regarding routine follow-up for this.    Hypnic headache  Requesting refills on sumatriptan.  -     sumatriptan (IMITREX) 100 MG tablet; Take 1 tablet by mouth one time as needed for Migraine for up to 1 dose.    Insomnia, unspecified type  Chronic problem for the patient.  Trial of doxepin.    -     Doxepin HCl 3 MG Tab; Take 3 mg by mouth at bedtime as needed.  -             No follow-ups on file.          Jeanine SOSA

## 2021-03-12 ENCOUNTER — OFFICE VISIT (OUTPATIENT)
Dept: NEUROLOGY | Facility: MEDICAL CENTER | Age: 61
End: 2021-03-12
Attending: PSYCHIATRY & NEUROLOGY
Payer: COMMERCIAL

## 2021-03-12 VITALS
DIASTOLIC BLOOD PRESSURE: 62 MMHG | OXYGEN SATURATION: 95 % | BODY MASS INDEX: 24.8 KG/M2 | WEIGHT: 154.32 LBS | SYSTOLIC BLOOD PRESSURE: 110 MMHG | TEMPERATURE: 98.1 F | HEART RATE: 81 BPM | HEIGHT: 66 IN

## 2021-03-12 DIAGNOSIS — G43.019 INTRACTABLE MIGRAINE WITHOUT AURA AND WITHOUT STATUS MIGRAINOSUS: ICD-10-CM

## 2021-03-12 DIAGNOSIS — G44.81 HYPNIC HEADACHE: Chronic | ICD-10-CM

## 2021-03-12 PROCEDURE — 99212 OFFICE O/P EST SF 10 MIN: CPT | Performed by: PSYCHIATRY & NEUROLOGY

## 2021-03-12 PROCEDURE — 99214 OFFICE O/P EST MOD 30 MIN: CPT | Performed by: PSYCHIATRY & NEUROLOGY

## 2021-03-12 RX ORDER — VENLAFAXINE HYDROCHLORIDE 37.5 MG/1
75 CAPSULE, EXTENDED RELEASE ORAL DAILY
Qty: 60 CAPSULE | Refills: 2 | Status: SHIPPED | OUTPATIENT
Start: 2021-03-12

## 2021-03-12 NOTE — PROGRESS NOTES
Chief Complaint   Patient presents with   • Follow-Up     headache     History of present illness:  Maryann Ashley 59 y.o. female presents today for headaches f/u.   History is obtained from patient.  and Patient is accompanied by self.  Her  is also my patient.    Duration/timing: onset earlier adult life with worsening later on in life about 2009; 20/30 headache days a month; duration > 4 hours  Context: Migraines  Location: left temporal  Quality: dull pounding  Severity: severe  Modifying factors: worse with aspartane; improved with stopping aspartane   Associated signs/symptoms: light sensitivity  Denies: bladder incontinence, bowel incontinence, dizziness, vision changes/loss or diplopia, head trauma , weakness, numbness/tingling, swallowing difficulties, speech disturbance, incoordination, depression, anxiety, hearing loss, loss of consciousness, hallucinations, abnormal movements and falls     Duration/timing: onset 2009, occurs at 2:58 in the morning  Context: Hypnic headaches  Location: left temporal  Quality: sharp  Severity: moderate to severe  Modifying factors: see below  Associated signs/symptoms: none  Denies: As above    Patient has tried:  -Indomethacin - 75mg nightly taken with cup of coffee, initiated 2015, refilled by PCP  -Caffeine pills - caused more headaches  -Propranolol - 10mg BID, started years ago - did not tolerate increased due to low blood pressure and worsening headache, currently on 40 mg twice daily  -Imitrex - with good abortive effect takes 50mg (1/2 100mg) - 90% of the time  -Topiramate -caused abnormal mood  -Venlafaxine 37.5 mg daily -increase to 75 mg daily October 2020, with modest improvement    Subjective: Patient was last seen in neurology clinic on October 2020.     Hypnic Headaches: Controlled with caffeine and indomethacin.     Migraines: the venlafaxine sometimes helps but still persistent headaches. She has 16/30 headache days a week. She takes imitrex full  Rx each month essentially.  No change in headache characteristics.      Past medical history:   Past Medical History:   Diagnosis Date   • Fuchs' corneal dystrophy 2018   • Giant cell tumor of bone 2006    removal from L distal femur   • Hypertension    • Hypnic headache    • Migraine    • Thyroid nodule         Past surgical history:   Past Surgical History:   Procedure Laterality Date   • OTHER      giant cell tumor removed rom Left distal femur   • FUSION      cervical fusion C4-6   • ABDOMINAL HYSTERECTOMY TOTAL  1985    partial , for fibroids   • OTHER ORTHOPEDIC SURGERY      right wrist/base of thumb surgery for arthritis       Family history:   Family History   Problem Relation Age of Onset   • Breast Cancer Mother 30         at 63 yr   • Cancer Father         metastatic; Lung   • Heart Disease Father    • Hypertension Father    • Stroke Father    • Cancer Sister         breast   • Diabetes Sister    • Diabetes Maternal Grandmother        Social history:   Tobacco Use   • Smoking status: Never Smoker   • Smokeless tobacco: Never Used   Substance and Sexual Activity   • Alcohol use: Yes     Frequency: Monthly or less     Comment: 1 per month   • Drug use: No   • Sexual activity: Yes     Partners: Male     Comment: , homemaker     Current medications:   Current Outpatient Medications   Medication   • venlafaxine XR (EFFEXOR XR) 37.5 MG CAPSULE SR 24 HR   • Doxepin HCl 3 MG Tab   • sumatriptan (IMITREX) 100 MG tablet   • cyclobenzaprine (FLEXERIL) 10 mg Tab   • atorvastatin (LIPITOR) 20 MG Tab   • propranolol (INDERAL) 40 MG Tab   • alendronate (FOSAMAX) 70 MG Tab   • indomethacin SR (INDOCIN SR) 75 MG Cap CR   • Diclofenac Sodium (PENNSAID) 2 % Solution     No current facility-administered medications for this visit.       Medication Allergy:  No Known Allergies    Review of Systems   Neurological:        As per HPI       Physical examination:   Vitals:    21 1504   BP: 110/62   BP  "Location: Right arm   Patient Position: Sitting   BP Cuff Size: Adult   Pulse: 81   Temp: 36.7 °C (98.1 °F)   TempSrc: Temporal   SpO2: 95%   Weight: 70 kg (154 lb 5.2 oz)   Height: 1.676 m (5' 6\")     General: Patient in well nourished in no apparent distress.  Eyes: Ophthalmoscopic examination performed but discs cannot be visualized well enough to characterize bilaterally. Prior exam  HENT: Normocephalic, atraumatic. Mallapatic score 2 Prior exam  Cardiovascular: No lower extremity edema.  Respiratory: Normal respiratory effort.   Skin: No appreciable signs of acute rashes or bruising.   Musculoskeletal: No signs of joint or muscle swelling.   Psychiatric: Pleasant.     NEUROLOGICAL EXAM:   Mental status: Awake, alert and fully oriented to person, place, time and situation. Normal attention, concentration and fund of knowledge for education level.   Speech and language: Speech is fluent without errors and clear.  Cranial nerve exam:  II: PERRL. Visual fields are intact by confrontation. Prior exam  III, IV, VI: EOMI, no diplopia, no ptosis.Prior exam  V: Sensation to light touch is normal over V1-3 distributions bilaterally.   Prior exam  VII: Facial movements are symmetrical. There is no facial droop.  Prior exam  VIII: Hearing intact to soft speech   IX:  Dysarthria is not present.  XI: Shoulder shrug are symmetrical and strong. Prior exam  XII: Tongue protrudes midline. Prior exam    Motor exam: Prior exam as documented below, today she is moving all extremities equally  Muscle tone is normal in all 4 limbs. and No abnormal movements appreciated.  Rapid finger tapping is equal bilaterally.    Muscle strength:     Right  Left  Deltoid   5/5  5/5      Biceps   5/5  5/5  Triceps  5/5  5/5   Wrist extensors 5/5  5/5  Wrist flexors  5/5  5/5     5/5  5/5  Interossei  5/5  5/5  Thenar (APB)  NT/5  NT/5   Hip " flexors  5/5  5/5  Quadriceps  5-/5  5-/5    Hamstrings  5-/5  5-/5  Dorsiflexors  5/5  5/5  Plantarflexors  5/5  5/5  Toe extension  NT/5  NT/5  NT = not tested    Sensory exam: Prior exam  Intact to Light touch in bilateral upper and lower extremity.    Reflexes:  Prior exam     Right  Left  Biceps   0/4  0/4  Triceps  0/4  0/4  Brachioradialis 0/4  0/4  Knee jerk  1/4  1/4  Ankle jerk  1/4  1/4   bilateral toes are mute to plantar stimulation..      Coordination: shows a normal finger-nose-finger and heel to shin bilaterally.  Prior exam  Gait: Casual gait is normal.      ANCILLARY DATA REVIEWED:   Lab Data Review:  Lab Results   Component Value Date/Time    HBA1C 5.4 07/16/2018 12:00 AM      Imaging: None  Records reviewed: Chart reviewed.  PCP note reviewed dated October 2020.      ASSESSMENT AND PLAN:    1. Hypnic headache, controlled: Prior diagnosis based on clinical history.  Patient has been chronically treated by primary care with daily indomethacin and a cup of coffee with good response.  Primary care does monitor her renal function intermittently.  Otherwise well controlled.  MRI brain with without contrast in 2009 at the onset of hypnic headache was unremarkable for secondary actual cause.  -Previously discussed other treatment options including but not limited to caffeine pills, daily caffeine amount recommended for treatment of hypnic headaches, other preventative options including but not limited to for topiramate and lamotrigine, risk/benefits of side effects and alternatives to those medications  -Agree with monitoring of renal function in the interim, will defer to primary care  -Patient on indomethacin 75 mg daily by PCP and caffeine  -Consider Zonegran for further preventative treatment treatment    2. Migraine without aura and without status migrainosus, intractable, worsening: Chronic history of.  Based on clinical history.  Patient has had continued worsening of her headaches now up to 20  headache days per month.  She is felt to first-line oral preventatives.  There are no red flag symptoms or focal exam findings to suggest secondary etiology.  Her MRI brain without contrast in 2009 also unremarkable for secondary structural cause.  Would benefit from further adjustments of preventative therapy  -Propranolol 40 mg twice daily for headache prevention  -Continue venlafaxine to 75 mg daily -consider wean off medication if Botox is effective  -We personally discuss further treatment options for her intractable headaches including but not limited to Emgality and Botox.  She is previously approved for Botox but decided not to pursue it which and has now changed her mind.  -Imitrex for abortive therapy  -Message sent to Demyra to see if additional referral is necessary for year 2021    Botulinum Toxin Injection for Chronic Migraine   Procedure: Botulinum toxin injection per PREEMPT protocol  Diagnosis: Chronic Migraine  - Procerus 5 units  -  10 units (5 unit per site, 1 site each side)  - Frontalis 20 units (5 units per site, 2 sites each side)  - Temporalis 40 units (5 units per site, 4 sites each side)  - Occipitalis 30 units (5 units per site, 3 sites each side)  - Cervical paraspinals 20 units (5 units per site, 2 sites each side)  - Trapezius 30 units (5 units per site, 3 sites each side)    Total: 155 units of onabotulinum toxin A  Order: 200 units for procedure    FOLLOW-UP: Return for First available Botox.    EDUCATION AND COUNSELING:  -I personally discussed the following with the patient:   Risks/benefits/side effects/alternatives of medication including but not limited to drowsiness, sedation, dizziness, GI side effects (gastritis, ulcers, bleeding, changes in appetite, pancreatitis, change in bowel habits) and Fatigue, local site reactions with Emgality injection., Risks/benefits/side effects/alternatives to treatment of chronic intractable migraine including but not limited to side  effects to Botox (bruising and injection site reactions, facial asymmetry, trouble swallowing, head drop, systemic side effects such as shortness of breath which can be life-threatening) and Discussed with patient that CGRP antagonists are relatively new medications/new to market and there are some potential risks associated with new medications.  Adverse events related to the medication may occur when a larger patient population with other comorbid conditions not evaluated in clinical studies use the medication.  This is in addition to already known side effects as discussed.    This dictation was created using voice recognition software. I have made every reasonable attempt to avoid dictation errors, but this document may contain an error not identified before finalizing. If the error changes the accuracy of the document, I would appreciate it being brought to my attention. Thank you very much.     Priti De Guzman MD  Neurology  [facility]

## 2021-03-13 DIAGNOSIS — G44.81 HYPNIC HEADACHE: ICD-10-CM

## 2021-03-14 RX ORDER — IPRATROPIUM BROMIDE 42 UG/1
2 SPRAY, METERED NASAL 4 TIMES DAILY
Qty: 15 ML | Refills: 1 | Status: SHIPPED | OUTPATIENT
Start: 2021-03-14

## 2021-03-15 DIAGNOSIS — G43.709 CHRONIC MIGRAINE W/O AURA W/O STATUS MIGRAINOSUS, NOT INTRACTABLE: ICD-10-CM

## 2021-03-15 DIAGNOSIS — Z23 NEED FOR VACCINATION: ICD-10-CM

## 2021-03-15 RX ORDER — PROPRANOLOL HYDROCHLORIDE 40 MG/1
TABLET ORAL
Qty: 60 TABLET | Refills: 6 | Status: SHIPPED | OUTPATIENT
Start: 2021-03-15 | End: 2021-11-19

## 2021-03-15 NOTE — TELEPHONE ENCOUNTER
Patient Seen: 3/10/2021 With SHEILA Ravi  Next Appointment: None  Was the patient seen in the last year in this department? Yes     Does patient have an active prescription for medications requested? No     Received Request Via: Pharmacy

## 2021-03-17 RX ORDER — TRAZODONE HYDROCHLORIDE 100 MG/1
100 TABLET ORAL NIGHTLY PRN
Qty: 90 TABLET | Refills: 3 | Status: SHIPPED | OUTPATIENT
Start: 2021-03-17

## 2021-03-19 ENCOUNTER — IMMUNIZATION (OUTPATIENT)
Dept: FAMILY PLANNING/WOMEN'S HEALTH CLINIC | Facility: IMMUNIZATION CENTER | Age: 61
End: 2021-03-19
Attending: INTERNAL MEDICINE
Payer: COMMERCIAL

## 2021-03-19 DIAGNOSIS — Z23 NEED FOR VACCINATION: ICD-10-CM

## 2021-03-19 DIAGNOSIS — Z23 ENCOUNTER FOR VACCINATION: Primary | ICD-10-CM

## 2021-03-19 PROCEDURE — 0011A MODERNA SARS-COV-2 VACCINE: CPT

## 2021-03-19 PROCEDURE — 91301 MODERNA SARS-COV-2 VACCINE: CPT

## 2021-03-31 ENCOUNTER — APPOINTMENT (RX ONLY)
Dept: URBAN - METROPOLITAN AREA CLINIC 22 | Facility: CLINIC | Age: 61
Setting detail: DERMATOLOGY
End: 2021-03-31

## 2021-03-31 DIAGNOSIS — L81.4 OTHER MELANIN HYPERPIGMENTATION: ICD-10-CM

## 2021-03-31 DIAGNOSIS — Z87.2 PERSONAL HISTORY OF DISEASES OF THE SKIN AND SUBCUTANEOUS TISSUE: ICD-10-CM

## 2021-03-31 DIAGNOSIS — L82.1 OTHER SEBORRHEIC KERATOSIS: ICD-10-CM

## 2021-03-31 DIAGNOSIS — Z85.828 PERSONAL HISTORY OF OTHER MALIGNANT NEOPLASM OF SKIN: ICD-10-CM

## 2021-03-31 DIAGNOSIS — D22 MELANOCYTIC NEVI: ICD-10-CM

## 2021-03-31 DIAGNOSIS — L20.89 OTHER ATOPIC DERMATITIS: ICD-10-CM | Status: RESOLVING

## 2021-03-31 PROBLEM — L20.84 INTRINSIC (ALLERGIC) ECZEMA: Status: ACTIVE | Noted: 2021-03-31

## 2021-03-31 PROBLEM — D22.5 MELANOCYTIC NEVI OF TRUNK: Status: ACTIVE | Noted: 2021-03-31

## 2021-03-31 PROCEDURE — ? SUNSCREEN TREATMENT REGIMEN

## 2021-03-31 PROCEDURE — 99213 OFFICE O/P EST LOW 20 MIN: CPT

## 2021-03-31 PROCEDURE — ? COUNSELING

## 2021-03-31 ASSESSMENT — LOCATION DETAILED DESCRIPTION DERM
LOCATION DETAILED: SUPERIOR THORACIC SPINE
LOCATION DETAILED: INFERIOR MID FOREHEAD
LOCATION DETAILED: RIGHT VENTRAL PROXIMAL FOREARM
LOCATION DETAILED: LEFT MEDIAL SUPERIOR CHEST
LOCATION DETAILED: LEFT VENTRAL PROXIMAL FOREARM
LOCATION DETAILED: RIGHT DISTAL CALF
LOCATION DETAILED: INFERIOR THORACIC SPINE
LOCATION DETAILED: PERIUMBILICAL SKIN

## 2021-03-31 ASSESSMENT — LOCATION ZONE DERM
LOCATION ZONE: TRUNK
LOCATION ZONE: ARM
LOCATION ZONE: FACE
LOCATION ZONE: LEG

## 2021-03-31 ASSESSMENT — LOCATION SIMPLE DESCRIPTION DERM
LOCATION SIMPLE: RIGHT FOREARM
LOCATION SIMPLE: ABDOMEN
LOCATION SIMPLE: UPPER BACK
LOCATION SIMPLE: INFERIOR FOREHEAD
LOCATION SIMPLE: RIGHT CALF
LOCATION SIMPLE: CHEST
LOCATION SIMPLE: LEFT FOREARM

## 2021-03-31 NOTE — PROCEDURE: COUNSELING
Detail Level: Zone
Detail Level: Detailed
Detail Level: Simple
Patient Specific Counseling (Will Not Stick From Patient To Patient): Can use tac 0.1% bid for 2 weeks

## 2021-04-10 DIAGNOSIS — G44.81 HYPNIC HEADACHE: ICD-10-CM

## 2021-04-13 RX ORDER — INDOMETHACIN 75 MG/1
CAPSULE, EXTENDED RELEASE ORAL
Qty: 30 CAPSULE | Refills: 6 | Status: SHIPPED | OUTPATIENT
Start: 2021-04-13 | End: 2021-11-19

## 2021-04-17 ENCOUNTER — IMMUNIZATION (OUTPATIENT)
Dept: FAMILY PLANNING/WOMEN'S HEALTH CLINIC | Facility: IMMUNIZATION CENTER | Age: 61
End: 2021-04-17
Attending: INTERNAL MEDICINE
Payer: COMMERCIAL

## 2021-04-17 DIAGNOSIS — Z23 ENCOUNTER FOR VACCINATION: Primary | ICD-10-CM

## 2021-04-17 PROCEDURE — 0012A MODERNA SARS-COV-2 VACCINE: CPT

## 2021-04-17 PROCEDURE — 91301 MODERNA SARS-COV-2 VACCINE: CPT

## 2021-04-20 ENCOUNTER — OFFICE VISIT (OUTPATIENT)
Dept: NEUROLOGY | Facility: MEDICAL CENTER | Age: 61
End: 2021-04-20
Attending: PSYCHIATRY & NEUROLOGY
Payer: COMMERCIAL

## 2021-04-20 VITALS
OXYGEN SATURATION: 98 % | RESPIRATION RATE: 14 BRPM | WEIGHT: 150 LBS | HEIGHT: 66 IN | SYSTOLIC BLOOD PRESSURE: 120 MMHG | BODY MASS INDEX: 24.11 KG/M2 | DIASTOLIC BLOOD PRESSURE: 72 MMHG | TEMPERATURE: 97.9 F | HEART RATE: 63 BPM

## 2021-04-20 DIAGNOSIS — G43.019 INTRACTABLE MIGRAINE WITHOUT AURA AND WITHOUT STATUS MIGRAINOSUS: ICD-10-CM

## 2021-04-20 PROCEDURE — 64615 CHEMODENERV MUSC MIGRAINE: CPT | Performed by: PSYCHIATRY & NEUROLOGY

## 2021-04-20 NOTE — PROGRESS NOTES
Botulinum Toxin Injection for Chronic Migraine    Last injection date: first injection  Response: N/A  Complication: N/A  Subjective: 16 out of 30 headache days a month.  Severe intensity.  Duration greater than 4 hours.     Procedure: Botulinum toxin injection per PREEMPT protocol  Diagnosis: Chronic Migraine  Performed: [facility]  Performed by: Priti De Guzman MD  Consent Obtained: Yes (including risks, benefits, and alternatives)  The risk include but not limited to neck pain, headache, migraine, slight or partial facial paralysis, eyelid drooping, distant side effects such as breathing problems.  Follow up/Discharge Instructions: Given     Description: The skin was exposed and prepped with alcohol. Injections were done using sterile techniques.  Botox (lot # [id number], expiration date 12/2023) was mixed in presence of the patient with preservative free normal saline in standard dilution 10 units in 0.1 ml and injected as follows:  - Procerus 5 units  -  10 units (5 unit per site, 1 site each side)  - Frontalis 20 units (5 units per site, 2 sites each side)  - Temporalis 40 units (5 units per site, 4 sites each side)  - Occipitalis 30 units (5 units per site, 3 sites each side)  - Cervical paraspinals 20 units (5 units per site, 2 sites each side)  - Trapezius 30 units (5 units per site, 3 sites each side)     Total 155 units used and 45 units wasted.   Patient tolerated procedure well with minimal blood loss.   Not buy and bill. Patient supplied.     Priti De Guzman MD  Neurology - Neurophysiology  [facility]

## 2021-04-26 RX ORDER — CYCLOBENZAPRINE HCL 10 MG
TABLET ORAL
Qty: 60 TABLET | Refills: 0 | Status: SHIPPED | OUTPATIENT
Start: 2021-04-26 | End: 2021-11-19

## 2021-04-27 ENCOUNTER — HOSPITAL ENCOUNTER (OUTPATIENT)
Dept: RADIOLOGY | Facility: MEDICAL CENTER | Age: 61
End: 2021-04-27
Attending: NURSE PRACTITIONER
Payer: COMMERCIAL

## 2021-04-27 ENCOUNTER — OFFICE VISIT (OUTPATIENT)
Dept: MEDICAL GROUP | Facility: MEDICAL CENTER | Age: 61
End: 2021-04-27
Payer: COMMERCIAL

## 2021-04-27 VITALS
OXYGEN SATURATION: 99 % | TEMPERATURE: 97.1 F | HEART RATE: 66 BPM | BODY MASS INDEX: 24.59 KG/M2 | RESPIRATION RATE: 16 BRPM | HEIGHT: 66 IN | WEIGHT: 153 LBS | DIASTOLIC BLOOD PRESSURE: 62 MMHG | SYSTOLIC BLOOD PRESSURE: 116 MMHG

## 2021-04-27 DIAGNOSIS — M54.50 ACUTE BILATERAL LOW BACK PAIN WITHOUT SCIATICA: ICD-10-CM

## 2021-04-27 DIAGNOSIS — Z11.59 ENCOUNTER FOR HEPATITIS C SCREENING TEST FOR LOW RISK PATIENT: ICD-10-CM

## 2021-04-27 DIAGNOSIS — R05.9 COUGH: ICD-10-CM

## 2021-04-27 DIAGNOSIS — J31.0 RHINITIS, UNSPECIFIED TYPE: ICD-10-CM

## 2021-04-27 DIAGNOSIS — Z87.448 HISTORY OF HEMATURIA: ICD-10-CM

## 2021-04-27 DIAGNOSIS — R23.3 EASY BRUISING: ICD-10-CM

## 2021-04-27 DIAGNOSIS — R20.0 BILATERAL HAND NUMBNESS: ICD-10-CM

## 2021-04-27 PROCEDURE — 99213 OFFICE O/P EST LOW 20 MIN: CPT | Performed by: NURSE PRACTITIONER

## 2021-04-27 PROCEDURE — 71046 X-RAY EXAM CHEST 2 VIEWS: CPT

## 2021-04-27 PROCEDURE — 72100 X-RAY EXAM L-S SPINE 2/3 VWS: CPT

## 2021-04-27 NOTE — PROGRESS NOTES
"Chief Complaint   Patient presents with   • Numbness     both hands x2-3 weeks    • Cough     stil lingers    • Side Pain     rigth side easily bruises [id number]        Subjective:     HPI:     Maryann Ashley is a 60 y.o. female here to discuss the evaluation and management of:    About a month ago her \"back went out.\" The act of getting up and down from a seated position bothers her. She reports getting an adjustment from the Chiropractor-had some improvement but then occurred again recently while pulling weeds.     No loss of bowel bowel or bladder. No saddle anesthesia.  L>R.  No reduction of LE strength.   Needs imaging.     Over the last two weeks both hands having numbness in her hands. No swelling.  No color changes to her skin. No pain. Resolves after thirty minutes. No neck pain. No symptoms in the last few days.     Has noticed more bruising lately. She does no recall any specific injuries prior. States having a trace of blood in her urine-and then reports \"has been for years.\" No blood in stool.  No vaginal bleeding.     Follow up intermittent cough-not as frequent since last visit.     Has had weight gain. No night sweats.     ROS:  Denies any Headache, Blurred Vision, Confusion, Chest pain,  Shortness of breath,  Abdominal pain, Changes of bowel or bladder, Lower ext edema, Fevers, Nights sweats, Weight Changes, Focal weakness or numbness.  And all other systems reviewed and are all negative. POSITIVE FOR see above        Current Outpatient Medications:   •  cyclobenzaprine (FLEXERIL) 10 mg Tab, Take 1 tablet by mouth three times daily as needed, Disp: 60 tablet, Rfl: 0  •  indomethacin SR (INDOCIN SR) 75 MG Cap CR, Take 1 capsule by mouth once daily, Disp: 30 capsule, Rfl: 6  •  traZODone (DESYREL) 100 MG Tab, Take 1 tablet by mouth at bedtime as needed for Sleep., Disp: 90 tablet, Rfl: 3  •  propranolol (INDERAL) 40 MG Tab, Take 1 tablet by mouth twice daily, Disp: 60 tablet, Rfl: 6  •  ipratropium " (ATROVENT) 0.06 % Solution, Administer 2 Sprays into affected nostril(S) 4 times a day., Disp: 15 mL, Rfl: 1  •  venlafaxine XR (EFFEXOR XR) 37.5 MG CAPSULE SR 24 HR, Take 2 Capsules by mouth every day., Disp: 60 capsule, Rfl: 2  •  sumatriptan (IMITREX) 100 MG tablet, Take 1 tablet by mouth one time as needed for Migraine for up to 1 dose., Disp: 10 tablet, Rfl: 6  •  atorvastatin (LIPITOR) 20 MG Tab, Take 1 Tab by mouth every bedtime., Disp: 90 Tab, Rfl: 3  •  alendronate (FOSAMAX) 70 MG Tab, Take 1 Tab by mouth every 7 days., Disp: 4 Tab, Rfl: 3    No Known Allergies    Past Medical History:   Diagnosis Date   • Fuchs' corneal dystrophy 2018   • Giant cell tumor of bone 2006    removal from L distal femur   • Hypertension    • Hypnic headache    • Migraine    • Thyroid nodule      Past Surgical History:   Procedure Laterality Date   • OTHER      giant cell tumor removed rom Left distal femur   • FUSION      cervical fusion C4-6   • ABDOMINAL HYSTERECTOMY TOTAL  1985    partial , for fibroids   • OTHER ORTHOPEDIC SURGERY      right wrist/base of thumb surgery for arthritis     Family History   Problem Relation Age of Onset   • Breast Cancer Mother 30         at 63 yr   • Cancer Father         metastatic; Lung   • Heart Disease Father    • Hypertension Father    • Stroke Father    • Cancer Sister         breast   • Diabetes Sister    • Diabetes Maternal Grandmother      Social History     Socioeconomic History   • Marital status:      Spouse name: Not on file   • Number of children: 2   • Years of education: Ass college   • Highest education level: Not on file   Occupational History     Comment: Retired Computer work   Tobacco Use   • Smoking status: Never Smoker   • Smokeless tobacco: Never Used   Substance and Sexual Activity   • Alcohol use: Yes     Comment: 1 per month   • Drug use: No   • Sexual activity: Yes     Partners: Male     Comment: , homemaker   Other Topics Concern   •  " Service Not Asked   • Blood Transfusions Not Asked   • Caffeine Concern Not Asked   • Occupational Exposure Not Asked   • Hobby Hazards Not Asked   • Sleep Concern Not Asked   • Stress Concern Not Asked   • Weight Concern Not Asked   • Special Diet Not Asked   • Back Care Not Asked   • Exercise Yes   • Bike Helmet Not Asked   • Seat Belt Yes   • Self-Exams Not Asked   Social History Narrative    Lives with      Social Determinants of Health     Financial Resource Strain:    • Difficulty of Paying Living Expenses:    Food Insecurity:    • Worried About Running Out of Food in the Last Year:    • Ran Out of Food in the Last Year:    Transportation Needs:    • Lack of Transportation (Medical):    • Lack of Transportation (Non-Medical):    Physical Activity:    • Days of Exercise per Week:    • Minutes of Exercise per Session:    Stress:    • Feeling of Stress :    Social Connections:    • Frequency of Communication with Friends and Family:    • Frequency of Social Gatherings with Friends and Family:    • Attends [employer] Services:    • Active Member of Clubs or Organizations:    • Attends Club or Organization Meetings:    • Marital Status:    Intimate Partner Violence:    • Fear of Current or Ex-Partner:    • Emotionally Abused:    • Physically Abused:    • Sexually Abused:        Objective:     Vitals: /62 (BP Location: Right arm, Patient Position: Sitting, BP Cuff Size: Adult)   Pulse 66   Temp 36.2 °C (97.1 °F) (Temporal)   Resp 16   Ht 1.676 m (5' 6\")   Wt 69.4 kg (153 lb)   SpO2 99%   BMI 24.69 kg/m²    General: Alert, pleasant, NAD  HEENT: Normocephalic.    Skin: Warm, dry, no rashes.  Extremities: No leg edema. No discoloration  Neurological: No tremors  Psych:  Affect/mood is normal, judgement is good, memory is intact, grooming is appropriate.    Assessment/Plan:     Maryann was seen today for numbness, cough and side pain.    Diagnoses and all orders for this visit:    Bilateral " hand numbness  New problem to me. Intermittent in nature, no neck pain. Do not suspect cervical stenosis. More consistent with impingement. Recommend upper head and neck stretching at this time. Check B12.  -     Comp Metabolic Panel; Future  -     VITAMIN B12; Future    Acute bilateral low back pain without sciatica  New problem to me. Suspect more MSK injury. Recommend stretching, ice/heat, avoid heavy lifting, pushing and pulling at this time.  Plain films ordered.   -     DX-LUMBAR SPINE-2 OR 3 VIEWS; Future    Rhinitis, unspecified type    Easy bruising  One small bruise on left lateral hip.   -     CBC WITH DIFFERENTIAL; Future  -     IRON/TOTAL IRON BIND; Future  -     FERRITIN; Future    History of hematuria  New problem to me. Patient reports this has been present in the past. Will check micro and UA.  -     MICROALBUMIN CREAT RATIO URINE; Future  -     URINALYSIS; Future    Cough  -     DX-CHEST-2 VIEWS; Future    Encounter for hepatitis C screening test for low risk patient  -     HEP C VIRUS ANTIBODY; Future      No follow-ups on file.          Jeanine ABRAHAM.

## 2021-05-13 DIAGNOSIS — G43.009 MIGRAINE WITHOUT AURA AND WITHOUT STATUS MIGRAINOSUS, NOT INTRACTABLE: ICD-10-CM

## 2021-06-01 DIAGNOSIS — G89.29 CHRONIC LEFT-SIDED LOW BACK PAIN WITHOUT SCIATICA: ICD-10-CM

## 2021-06-01 DIAGNOSIS — M54.50 CHRONIC LEFT-SIDED LOW BACK PAIN WITHOUT SCIATICA: ICD-10-CM

## 2021-06-25 ENCOUNTER — OFFICE VISIT (OUTPATIENT)
Dept: MEDICAL GROUP | Facility: IMAGING CENTER | Age: 61
End: 2021-06-25

## 2021-07-16 ENCOUNTER — APPOINTMENT (OUTPATIENT)
Dept: MEDICAL GROUP | Facility: IMAGING CENTER | Age: 61
End: 2021-07-16

## 2021-07-24 ENCOUNTER — TELEPHONE (OUTPATIENT)
Dept: HEALTH INFORMATION MANAGEMENT | Facility: OTHER | Age: 61
End: 2021-07-24

## 2021-07-24 NOTE — TELEPHONE ENCOUNTER
Good morning,    Patient called as insurance will not pay for  Chiropractic service because they do not have a referral. Can you please forward referral to [facility] so they can rebill?     Nazia Corrales

## 2021-07-28 DIAGNOSIS — G43.009 MIGRAINE WITHOUT AURA AND WITHOUT STATUS MIGRAINOSUS, NOT INTRACTABLE: ICD-10-CM

## 2021-07-28 DIAGNOSIS — M54.50 CHRONIC LEFT-SIDED LOW BACK PAIN WITHOUT SCIATICA: ICD-10-CM

## 2021-07-28 DIAGNOSIS — G89.29 CHRONIC LEFT-SIDED LOW BACK PAIN WITHOUT SCIATICA: ICD-10-CM

## 2021-08-16 ENCOUNTER — APPOINTMENT (OUTPATIENT)
Dept: MEDICAL GROUP | Facility: IMAGING CENTER | Age: 61
End: 2021-08-16

## 2021-09-20 ENCOUNTER — APPOINTMENT (OUTPATIENT)
Dept: MEDICAL GROUP | Facility: IMAGING CENTER | Age: 61
End: 2021-09-20

## 2021-09-24 ENCOUNTER — APPOINTMENT (OUTPATIENT)
Dept: MEDICAL GROUP | Facility: IMAGING CENTER | Age: 61
End: 2021-09-24

## 2021-09-27 ENCOUNTER — APPOINTMENT (OUTPATIENT)
Dept: MEDICAL GROUP | Facility: IMAGING CENTER | Age: 61
End: 2021-09-27

## 2021-10-07 ENCOUNTER — APPOINTMENT (OUTPATIENT)
Dept: MEDICAL GROUP | Facility: IMAGING CENTER | Age: 61
End: 2021-10-07

## 2021-11-23 ENCOUNTER — APPOINTMENT (OUTPATIENT)
Dept: MEDICAL GROUP | Facility: IMAGING CENTER | Age: 61
End: 2021-11-23

## 2021-11-30 ENCOUNTER — APPOINTMENT (OUTPATIENT)
Dept: MEDICAL GROUP | Facility: IMAGING CENTER | Age: 61
End: 2021-11-30

## 2021-12-03 ENCOUNTER — APPOINTMENT (OUTPATIENT)
Dept: MEDICAL GROUP | Facility: IMAGING CENTER | Age: 61
End: 2021-12-03

## 2021-12-06 ENCOUNTER — APPOINTMENT (OUTPATIENT)
Dept: MEDICAL GROUP | Facility: IMAGING CENTER | Age: 61
End: 2021-12-06

## 2021-12-10 ENCOUNTER — APPOINTMENT (OUTPATIENT)
Dept: MEDICAL GROUP | Facility: IMAGING CENTER | Age: 61
End: 2021-12-10

## 2021-12-20 ENCOUNTER — APPOINTMENT (OUTPATIENT)
Dept: MEDICAL GROUP | Facility: IMAGING CENTER | Age: 61
End: 2021-12-20

## 2022-04-01 ENCOUNTER — APPOINTMENT (RX ONLY)
Dept: URBAN - METROPOLITAN AREA CLINIC 22 | Facility: CLINIC | Age: 62
Setting detail: DERMATOLOGY
End: 2022-04-01

## 2022-04-01 DIAGNOSIS — Z85.828 PERSONAL HISTORY OF OTHER MALIGNANT NEOPLASM OF SKIN: ICD-10-CM

## 2022-04-01 DIAGNOSIS — Z87.2 PERSONAL HISTORY OF DISEASES OF THE SKIN AND SUBCUTANEOUS TISSUE: ICD-10-CM

## 2022-04-01 DIAGNOSIS — L85.3 XEROSIS CUTIS: ICD-10-CM

## 2022-04-01 DIAGNOSIS — L81.4 OTHER MELANIN HYPERPIGMENTATION: ICD-10-CM

## 2022-04-01 DIAGNOSIS — L82.1 OTHER SEBORRHEIC KERATOSIS: ICD-10-CM

## 2022-04-01 DIAGNOSIS — D22 MELANOCYTIC NEVI: ICD-10-CM

## 2022-04-01 PROBLEM — D22.5 MELANOCYTIC NEVI OF TRUNK: Status: ACTIVE | Noted: 2022-04-01

## 2022-04-01 PROCEDURE — 99213 OFFICE O/P EST LOW 20 MIN: CPT

## 2022-04-01 PROCEDURE — ? SUNSCREEN TREATMENT REGIMEN

## 2022-04-01 PROCEDURE — ? COUNSELING

## 2022-04-01 ASSESSMENT — LOCATION DETAILED DESCRIPTION DERM
LOCATION DETAILED: RIGHT PROXIMAL CALF
LOCATION DETAILED: RIGHT PROXIMAL PRETIBIAL REGION
LOCATION DETAILED: RIGHT MEDIAL UPPER BACK
LOCATION DETAILED: SUPERIOR THORACIC SPINE
LOCATION DETAILED: LEFT MEDIAL SUPERIOR CHEST
LOCATION DETAILED: LEFT DISTAL CALF
LOCATION DETAILED: LEFT INFERIOR MEDIAL FOREHEAD
LOCATION DETAILED: STERNAL NOTCH
LOCATION DETAILED: RIGHT DISTAL CALF
LOCATION DETAILED: RIGHT VENTRAL PROXIMAL FOREARM
LOCATION DETAILED: LEFT VENTRAL PROXIMAL FOREARM
LOCATION DETAILED: LEFT PROXIMAL PRETIBIAL REGION

## 2022-04-01 ASSESSMENT — LOCATION SIMPLE DESCRIPTION DERM
LOCATION SIMPLE: RIGHT CALF
LOCATION SIMPLE: LEFT FOREARM
LOCATION SIMPLE: UPPER BACK
LOCATION SIMPLE: RIGHT FOREARM
LOCATION SIMPLE: LEFT PRETIBIAL REGION
LOCATION SIMPLE: RIGHT PRETIBIAL REGION
LOCATION SIMPLE: LEFT CALF
LOCATION SIMPLE: RIGHT UPPER BACK
LOCATION SIMPLE: CHEST
LOCATION SIMPLE: LEFT FOREHEAD

## 2022-04-01 ASSESSMENT — LOCATION ZONE DERM
LOCATION ZONE: TRUNK
LOCATION ZONE: FACE
LOCATION ZONE: ARM
LOCATION ZONE: LEG

## 2022-04-25 ENCOUNTER — APPOINTMENT (OUTPATIENT)
Dept: MEDICAL GROUP | Facility: IMAGING CENTER | Age: 62
End: 2022-04-25

## 2022-06-06 ENCOUNTER — APPOINTMENT (OUTPATIENT)
Dept: MEDICAL GROUP | Facility: IMAGING CENTER | Age: 62
End: 2022-06-06

## 2022-12-15 ENCOUNTER — APPOINTMENT (RX ONLY)
Dept: URBAN - METROPOLITAN AREA CLINIC 6 | Facility: CLINIC | Age: 62
Setting detail: DERMATOLOGY
End: 2022-12-15

## 2022-12-15 DIAGNOSIS — F42.4 EXCORIATION (SKIN-PICKING) DISORDER: ICD-10-CM

## 2022-12-15 DIAGNOSIS — L82.1 OTHER SEBORRHEIC KERATOSIS: ICD-10-CM

## 2022-12-15 PROBLEM — S80.921A UNSPECIFIED SUPERFICIAL INJURY OF RIGHT LOWER LEG, INITIAL ENCOUNTER: Status: ACTIVE | Noted: 2022-12-15

## 2022-12-15 PROCEDURE — 99213 OFFICE O/P EST LOW 20 MIN: CPT

## 2022-12-15 PROCEDURE — ? COUNSELING

## 2022-12-15 PROCEDURE — ? DIAGNOSIS COMMENT

## 2022-12-15 ASSESSMENT — LOCATION DETAILED DESCRIPTION DERM
LOCATION DETAILED: RIGHT DISTAL POSTERIOR UPPER ARM
LOCATION DETAILED: RIGHT DISTAL PRETIBIAL REGION

## 2022-12-15 ASSESSMENT — LOCATION ZONE DERM
LOCATION ZONE: LEG
LOCATION ZONE: ARM

## 2022-12-15 ASSESSMENT — LOCATION SIMPLE DESCRIPTION DERM
LOCATION SIMPLE: RIGHT POSTERIOR UPPER ARM
LOCATION SIMPLE: RIGHT PRETIBIAL REGION

## 2022-12-15 NOTE — PROCEDURE: DIAGNOSIS COMMENT
Comment: Present for 1 month. I will have the patient use Vaseline daily to help this heal and reduce crusting. If this fails to resolve advised to return to clinic in 1 month for reevaluation and possible biopsy at that time.
Detail Level: Simple
Render Risk Assessment In Note?: no

## 2023-04-10 ENCOUNTER — APPOINTMENT (RX ONLY)
Dept: URBAN - METROPOLITAN AREA CLINIC 6 | Facility: CLINIC | Age: 63
Setting detail: DERMATOLOGY
End: 2023-04-10

## 2023-04-10 DIAGNOSIS — L81.4 OTHER MELANIN HYPERPIGMENTATION: ICD-10-CM

## 2023-04-10 DIAGNOSIS — L73.8 OTHER SPECIFIED FOLLICULAR DISORDERS: ICD-10-CM

## 2023-04-10 DIAGNOSIS — L82.1 OTHER SEBORRHEIC KERATOSIS: ICD-10-CM

## 2023-04-10 DIAGNOSIS — D18.0 HEMANGIOMA: ICD-10-CM

## 2023-04-10 DIAGNOSIS — Z87.2 PERSONAL HISTORY OF DISEASES OF THE SKIN AND SUBCUTANEOUS TISSUE: ICD-10-CM

## 2023-04-10 DIAGNOSIS — Z71.89 OTHER SPECIFIED COUNSELING: ICD-10-CM

## 2023-04-10 DIAGNOSIS — Z85.828 PERSONAL HISTORY OF OTHER MALIGNANT NEOPLASM OF SKIN: ICD-10-CM

## 2023-04-10 DIAGNOSIS — D22 MELANOCYTIC NEVI: ICD-10-CM

## 2023-04-10 PROBLEM — D22.61 MELANOCYTIC NEVI OF RIGHT UPPER LIMB, INCLUDING SHOULDER: Status: ACTIVE | Noted: 2023-04-10

## 2023-04-10 PROBLEM — D22.5 MELANOCYTIC NEVI OF TRUNK: Status: ACTIVE | Noted: 2023-04-10

## 2023-04-10 PROBLEM — D18.01 HEMANGIOMA OF SKIN AND SUBCUTANEOUS TISSUE: Status: ACTIVE | Noted: 2023-04-10

## 2023-04-10 PROBLEM — D22.72 MELANOCYTIC NEVI OF LEFT LOWER LIMB, INCLUDING HIP: Status: ACTIVE | Noted: 2023-04-10

## 2023-04-10 PROBLEM — D22.71 MELANOCYTIC NEVI OF RIGHT LOWER LIMB, INCLUDING HIP: Status: ACTIVE | Noted: 2023-04-10

## 2023-04-10 PROBLEM — D22.62 MELANOCYTIC NEVI OF LEFT UPPER LIMB, INCLUDING SHOULDER: Status: ACTIVE | Noted: 2023-04-10

## 2023-04-10 PROCEDURE — ? COUNSELING

## 2023-04-10 PROCEDURE — ? DIAGNOSIS COMMENT

## 2023-04-10 PROCEDURE — 99213 OFFICE O/P EST LOW 20 MIN: CPT

## 2023-04-10 ASSESSMENT — LOCATION SIMPLE DESCRIPTION DERM
LOCATION SIMPLE: LEFT UPPER ARM
LOCATION SIMPLE: LEFT THIGH
LOCATION SIMPLE: RIGHT PRETIBIAL REGION
LOCATION SIMPLE: RIGHT KNEE
LOCATION SIMPLE: RIGHT FOREHEAD
LOCATION SIMPLE: ABDOMEN
LOCATION SIMPLE: RIGHT UPPER ARM
LOCATION SIMPLE: CHEST
LOCATION SIMPLE: RIGHT FOREARM
LOCATION SIMPLE: LEFT FOREARM
LOCATION SIMPLE: LEFT KNEE
LOCATION SIMPLE: LEFT PRETIBIAL REGION
LOCATION SIMPLE: RIGHT THIGH
LOCATION SIMPLE: RIGHT CALF

## 2023-04-10 ASSESSMENT — LOCATION DETAILED DESCRIPTION DERM
LOCATION DETAILED: PERIUMBILICAL SKIN
LOCATION DETAILED: LOWER STERNUM
LOCATION DETAILED: RIGHT ANTERIOR DISTAL THIGH
LOCATION DETAILED: RIGHT INFERIOR FOREHEAD
LOCATION DETAILED: RIGHT DISTAL CALF
LOCATION DETAILED: LEFT MEDIAL SUPERIOR CHEST
LOCATION DETAILED: RIGHT ANTERIOR PROXIMAL UPPER ARM
LOCATION DETAILED: RIGHT VENTRAL PROXIMAL FOREARM
LOCATION DETAILED: RIGHT PROXIMAL PRETIBIAL REGION
LOCATION DETAILED: EPIGASTRIC SKIN
LOCATION DETAILED: LEFT KNEE
LOCATION DETAILED: RIGHT KNEE
LOCATION DETAILED: LEFT ANTERIOR PROXIMAL UPPER ARM
LOCATION DETAILED: LEFT VENTRAL PROXIMAL FOREARM
LOCATION DETAILED: RIGHT ANTECUBITAL SKIN
LOCATION DETAILED: LEFT ANTERIOR DISTAL UPPER ARM
LOCATION DETAILED: LEFT LATERAL ABDOMEN
LOCATION DETAILED: LEFT PROXIMAL PRETIBIAL REGION
LOCATION DETAILED: LEFT ANTERIOR DISTAL THIGH
LOCATION DETAILED: RIGHT ANTERIOR DISTAL UPPER ARM

## 2023-04-10 ASSESSMENT — LOCATION ZONE DERM
LOCATION ZONE: ARM
LOCATION ZONE: FACE
LOCATION ZONE: LEG
LOCATION ZONE: TRUNK

## 2023-04-10 NOTE — PROCEDURE: DIAGNOSIS COMMENT
Detail Level: Simple
Comment: Accession #[id number]. Treated by [facility] 1/2020.
Render Risk Assessment In Note?: no

## 2024-02-01 ENCOUNTER — APPOINTMENT (RX ONLY)
Dept: URBAN - METROPOLITAN AREA CLINIC 6 | Facility: CLINIC | Age: 64
Setting detail: DERMATOLOGY
End: 2024-02-01

## 2024-02-01 DIAGNOSIS — L82.1 OTHER SEBORRHEIC KERATOSIS: ICD-10-CM

## 2024-02-01 DIAGNOSIS — D18.0 HEMANGIOMA: ICD-10-CM

## 2024-02-01 DIAGNOSIS — Z71.89 OTHER SPECIFIED COUNSELING: ICD-10-CM

## 2024-02-01 DIAGNOSIS — D22 MELANOCYTIC NEVI: ICD-10-CM

## 2024-02-01 DIAGNOSIS — L81.4 OTHER MELANIN HYPERPIGMENTATION: ICD-10-CM

## 2024-02-01 DIAGNOSIS — Z85.828 PERSONAL HISTORY OF OTHER MALIGNANT NEOPLASM OF SKIN: ICD-10-CM

## 2024-02-01 PROBLEM — D18.01 HEMANGIOMA OF SKIN AND SUBCUTANEOUS TISSUE: Status: ACTIVE | Noted: 2024-02-01

## 2024-02-01 PROBLEM — D22.5 MELANOCYTIC NEVI OF TRUNK: Status: ACTIVE | Noted: 2024-02-01

## 2024-02-01 PROCEDURE — 99213 OFFICE O/P EST LOW 20 MIN: CPT

## 2024-02-01 PROCEDURE — ? COUNSELING

## 2024-02-01 PROCEDURE — ? SUNSCREEN RECOMMENDATIONS

## 2024-02-01 PROCEDURE — ? DIAGNOSIS COMMENT

## 2024-02-01 ASSESSMENT — LOCATION DETAILED DESCRIPTION DERM
LOCATION DETAILED: MIDDLE STERNUM
LOCATION DETAILED: SUBXIPHOID
LOCATION DETAILED: UPPER STERNUM
LOCATION DETAILED: LEFT PROXIMAL DORSAL FOREARM
LOCATION DETAILED: EPIGASTRIC SKIN
LOCATION DETAILED: UPPER STERNUM

## 2024-02-01 ASSESSMENT — LOCATION ZONE DERM
LOCATION ZONE: TRUNK
LOCATION ZONE: ARM
LOCATION ZONE: TRUNK

## 2024-02-01 ASSESSMENT — LOCATION SIMPLE DESCRIPTION DERM
LOCATION SIMPLE: CHEST
LOCATION SIMPLE: ABDOMEN
LOCATION SIMPLE: LEFT FOREARM
LOCATION SIMPLE: CHEST

## 2024-04-22 PROBLEM — M77.12 LATERAL EPICONDYLITIS OF LEFT ELBOW: Status: ACTIVE | Noted: 2024-04-22

## 2024-04-22 PROBLEM — M19.032 OSTEOARTHRITIS OF LEFT WRIST: Status: ACTIVE | Noted: 2024-04-22

## 2025-02-03 ENCOUNTER — APPOINTMENT (OUTPATIENT)
Dept: URBAN - METROPOLITAN AREA CLINIC 6 | Facility: CLINIC | Age: 65
Setting detail: DERMATOLOGY
End: 2025-02-03

## 2025-02-03 DIAGNOSIS — Z71.89 OTHER SPECIFIED COUNSELING: ICD-10-CM

## 2025-02-03 DIAGNOSIS — L81.4 OTHER MELANIN HYPERPIGMENTATION: ICD-10-CM

## 2025-02-03 DIAGNOSIS — L91.8 OTHER HYPERTROPHIC DISORDERS OF THE SKIN: ICD-10-CM

## 2025-02-03 DIAGNOSIS — Z87.2 PERSONAL HISTORY OF DISEASES OF THE SKIN AND SUBCUTANEOUS TISSUE: ICD-10-CM

## 2025-02-03 DIAGNOSIS — L82.1 OTHER SEBORRHEIC KERATOSIS: ICD-10-CM

## 2025-02-03 DIAGNOSIS — D22 MELANOCYTIC NEVI: ICD-10-CM

## 2025-02-03 DIAGNOSIS — Z85.828 PERSONAL HISTORY OF OTHER MALIGNANT NEOPLASM OF SKIN: ICD-10-CM

## 2025-02-03 DIAGNOSIS — D18.0 HEMANGIOMA: ICD-10-CM

## 2025-02-03 PROBLEM — D18.01 HEMANGIOMA OF SKIN AND SUBCUTANEOUS TISSUE: Status: ACTIVE | Noted: 2025-02-03

## 2025-02-03 PROBLEM — D22.5 MELANOCYTIC NEVI OF TRUNK: Status: ACTIVE | Noted: 2025-02-03

## 2025-02-03 PROCEDURE — 99213 OFFICE O/P EST LOW 20 MIN: CPT

## 2025-02-03 PROCEDURE — ? SUNSCREEN RECOMMENDATIONS

## 2025-02-03 PROCEDURE — ? COUNSELING

## 2025-02-03 PROCEDURE — ? DIAGNOSIS COMMENT

## 2025-02-03 ASSESSMENT — LOCATION DETAILED DESCRIPTION DERM
LOCATION DETAILED: LEFT INFERIOR MEDIAL MALAR CHEEK
LOCATION DETAILED: RIGHT MEDIAL BREAST 4-5:00 REGION
LOCATION DETAILED: RIGHT MEDIAL SUPERIOR CHEST
LOCATION DETAILED: LEFT INFERIOR FOREHEAD
LOCATION DETAILED: EPIGASTRIC SKIN
LOCATION DETAILED: PERIUMBILICAL SKIN
LOCATION DETAILED: LEFT ULNAR DORSAL HAND
LOCATION DETAILED: RIGHT RADIAL DORSAL HAND
LOCATION DETAILED: INFERIOR THORACIC SPINE
LOCATION DETAILED: LEFT MEDIAL SUPERIOR CHEST

## 2025-02-03 ASSESSMENT — LOCATION SIMPLE DESCRIPTION DERM
LOCATION SIMPLE: LEFT CHEEK
LOCATION SIMPLE: RIGHT HAND
LOCATION SIMPLE: RIGHT BREAST
LOCATION SIMPLE: UPPER BACK
LOCATION SIMPLE: LEFT FOREHEAD
LOCATION SIMPLE: ABDOMEN
LOCATION SIMPLE: LEFT HAND
LOCATION SIMPLE: CHEST

## 2025-02-03 ASSESSMENT — LOCATION ZONE DERM
LOCATION ZONE: FACE
LOCATION ZONE: TRUNK
LOCATION ZONE: HAND

## 2025-02-03 NOTE — HPI: FULL BODY SKIN EXAMINATION
How Severe Are Your Spot(S)?: mild
What Type Of Note Output Would You Prefer (Optional)?: Standard Output
What Is The Reason For Today's Visit?: Full Body Skin Examination
What Is The Reason For Today's Visit? (Being Monitored For X): concerning skin lesions on an annual basis
Additional History: Couple spots on chest as well.